# Patient Record
Sex: FEMALE | Race: WHITE | NOT HISPANIC OR LATINO | Employment: OTHER | URBAN - METROPOLITAN AREA
[De-identification: names, ages, dates, MRNs, and addresses within clinical notes are randomized per-mention and may not be internally consistent; named-entity substitution may affect disease eponyms.]

---

## 2023-10-19 PROBLEM — M16.11 UNILATERAL PRIMARY OSTEOARTHRITIS, RIGHT HIP: Status: ACTIVE | Noted: 2023-10-19

## 2023-11-13 ENCOUNTER — APPOINTMENT (OUTPATIENT)
Dept: PREADMISSION TESTING | Facility: HOSPITAL | Age: 73
End: 2023-11-13
Payer: MEDICARE

## 2023-12-11 ENCOUNTER — APPOINTMENT (OUTPATIENT)
Dept: PREADMISSION TESTING | Facility: HOSPITAL | Age: 73
End: 2023-12-11
Payer: MEDICARE

## 2023-12-12 ENCOUNTER — OFFICE VISIT (OUTPATIENT)
Dept: ORTHOPEDIC SURGERY | Facility: HOSPITAL | Age: 73
End: 2023-12-12
Payer: MEDICARE

## 2023-12-12 DIAGNOSIS — M16.11 UNILATERAL PRIMARY OSTEOARTHRITIS, RIGHT HIP: Primary | ICD-10-CM

## 2023-12-12 PROCEDURE — 20611 DRAIN/INJ JOINT/BURSA W/US: CPT | Performed by: FAMILY MEDICINE

## 2023-12-12 PROCEDURE — 2500000004 HC RX 250 GENERAL PHARMACY W/ HCPCS (ALT 636 FOR OP/ED): Performed by: FAMILY MEDICINE

## 2023-12-12 PROCEDURE — 99214 OFFICE O/P EST MOD 30 MIN: CPT | Performed by: FAMILY MEDICINE

## 2023-12-12 PROCEDURE — 2500000005 HC RX 250 GENERAL PHARMACY W/O HCPCS: Performed by: FAMILY MEDICINE

## 2023-12-12 RX ADMIN — METHYLPREDNISOLONE ACETATE 80 MG: 40 INJECTION, SUSPENSION INTRA-ARTICULAR; INTRALESIONAL; INTRAMUSCULAR; INTRASYNOVIAL; SOFT TISSUE at 13:42

## 2023-12-12 RX ADMIN — LIDOCAINE HYDROCHLORIDE 7 ML: 10 INJECTION, SOLUTION EPIDURAL; INFILTRATION; INTRACAUDAL; PERINEURAL at 13:42

## 2023-12-12 NOTE — PROGRESS NOTES
Patient ID: Nancy Nuñez is a 73 y.o. female.    L Inj/Asp: R hip joint on 12/12/2023 1:42 PM  Indications: pain  Details: 20 G needle, ultrasound-guided anterior approach  Medications: 80 mg methylPREDNISolone acetate 40 mg/mL; 7 mL lidocaine PF 10 mg/mL (1 %)  Procedure, treatment alternatives, risks and benefits explained, specific risks discussed. Consent was given by the patient. Immediately prior to procedure a time out was called to verify the correct patient, procedure, equipment, support staff and site/side marked as required. Patient was prepped and draped in the usual sterile fashion.

## 2023-12-12 NOTE — PROGRESS NOTES
FUV/New Problem: Right Hip  Sports Medicine Office Note    Today's Date:  12/12/2023     HPI: Nancy Nuñez is a 73 y.o. retired  for [employer] and patient of Dr. Rodriguez who presents today for possible right intra-articular hip injection.  She is scheduled for a right hip replacement on 6/5/2024.  She has had ongoing right hip pain for about a year and does not recall any injury or trauma prior to the onset of pain.  She was taking Advil regularly up until a couple weeks ago when she discontinued it due to it being ineffective.  The pain is current all the time and worsens with ambulation.  Denies numbness, tingling.    She has no other complaints.    Physical Examination:   The right hip and pelvis are without obvious signs of acute bony deformity, swelling, erythema, ecchymosis or instability. Active and passive range of motion are limited and painful. Log roll is positive. Straight leg raise test is negative. Hermilo is positive. Crossover is negative. Hip strength is weak as compared to the opposite hip. The opposite hip is otherwise nontender and stable. Gait is antalgic and tandem.     Imaging:  === 09/22/23 ===    HIP W PELVIS    - Impression -  Severe right hip arthrosis with dysplasia.    Uncomplicated total left hip arthroplasty.    Procedure:  After consent was obtained, Right hip joint was prepped in a sterile fashion. Ultrasound guidance was used to help insure proper needle placement into the right hip joint, decrease patient discomfort, and decrease collateral damage. The joint was visualized and Depo-Medrol 80 mg with lidocaine 4 mL & ropivacaine 4 mL were injected without any complications. Ultrasound images were saved on an internal file for later reference. The patient tolerated the procedure well and the area was cleaned and bandaged.    Procedure Note Attestation   Procedure performed by my sports medicine fellow.    I, Dr. Issa Rivas MD, supervised the  entire procedure .    Problem List Items Addressed This Visit             ICD-10-CM    Unilateral primary osteoarthritis, right hip - Primary M16.11    Relevant Orders    Point of Care Ultrasound (Completed)       Assessment and Plan:  We reviewed the exam and x-ray findings and discussed the conservative and surgical treatment options. We agreed to a diagnostic and hopefully therapeutic cortisone injection into the RIGHT hip joint.   She tolerated this well. Activity modifications were reviewed.  She will keep any scheduled follow-up with Dr. Rodriguez. I will see her back as needed.    **This note was dictated using Dragon speech recognition software and was not corrected for spelling or grammatical errors**.

## 2023-12-13 RX ORDER — LIDOCAINE HYDROCHLORIDE 10 MG/ML
7 INJECTION, SOLUTION EPIDURAL; INFILTRATION; INTRACAUDAL; PERINEURAL
Status: COMPLETED | OUTPATIENT
Start: 2023-12-12 | End: 2023-12-12

## 2023-12-13 RX ORDER — METHYLPREDNISOLONE ACETATE 40 MG/ML
80 INJECTION, SUSPENSION INTRA-ARTICULAR; INTRALESIONAL; INTRAMUSCULAR; SOFT TISSUE
Status: COMPLETED | OUTPATIENT
Start: 2023-12-12 | End: 2023-12-12

## 2023-12-14 ENCOUNTER — OFFICE VISIT (OUTPATIENT)
Dept: GASTROENTEROLOGY | Facility: CLINIC | Age: 73
End: 2023-12-14
Payer: MEDICARE

## 2023-12-14 VITALS — HEART RATE: 74 BPM | WEIGHT: 150 LBS | BODY MASS INDEX: 24.21 KG/M2

## 2023-12-14 DIAGNOSIS — K59.04 CHRONIC IDIOPATHIC CONSTIPATION: Primary | ICD-10-CM

## 2023-12-14 PROCEDURE — 1159F MED LIST DOCD IN RCRD: CPT | Performed by: INTERNAL MEDICINE

## 2023-12-14 PROCEDURE — 99214 OFFICE O/P EST MOD 30 MIN: CPT | Performed by: INTERNAL MEDICINE

## 2023-12-14 PROCEDURE — 1036F TOBACCO NON-USER: CPT | Performed by: INTERNAL MEDICINE

## 2023-12-14 RX ORDER — MIRTAZAPINE 7.5 MG/1
7.5 TABLET, FILM COATED ORAL NIGHTLY PRN
COMMUNITY
Start: 2023-11-06 | End: 2024-05-22 | Stop reason: ALTCHOICE

## 2023-12-14 RX ORDER — AZELASTINE 1 MG/ML
2 SPRAY, METERED NASAL 2 TIMES DAILY
COMMUNITY
Start: 2022-02-22 | End: 2024-05-22 | Stop reason: ALTCHOICE

## 2023-12-14 RX ORDER — DOCUSATE SODIUM 100 MG/1
100 CAPSULE, LIQUID FILLED ORAL 2 TIMES DAILY PRN
COMMUNITY
Start: 2022-04-20 | End: 2024-05-22 | Stop reason: ALTCHOICE

## 2023-12-14 RX ORDER — MELOXICAM 15 MG/1
15 TABLET ORAL DAILY
COMMUNITY
Start: 2022-04-20 | End: 2024-05-22 | Stop reason: ALTCHOICE

## 2023-12-14 RX ORDER — TRAMADOL HYDROCHLORIDE 50 MG/1
50 TABLET ORAL EVERY 8 HOURS PRN
COMMUNITY
Start: 2022-04-20 | End: 2024-05-22 | Stop reason: ALTCHOICE

## 2023-12-14 RX ORDER — ARIPIPRAZOLE 2 MG/1
2 TABLET ORAL DAILY
COMMUNITY
Start: 2022-04-05 | End: 2024-05-22 | Stop reason: ALTCHOICE

## 2023-12-14 RX ORDER — METFORMIN HYDROCHLORIDE 500 MG/1
500 TABLET ORAL
COMMUNITY
Start: 2016-03-25 | End: 2024-05-22 | Stop reason: ALTCHOICE

## 2023-12-14 RX ORDER — MELATONIN 5 MG
1 CAPSULE ORAL NIGHTLY
COMMUNITY
End: 2024-05-22 | Stop reason: ALTCHOICE

## 2023-12-14 RX ORDER — ESCITALOPRAM OXALATE 10 MG/1
20 TABLET ORAL NIGHTLY
COMMUNITY
Start: 2023-12-13

## 2023-12-14 RX ORDER — BUSPIRONE HYDROCHLORIDE 7.5 MG/1
7.5 TABLET ORAL 3 TIMES DAILY
COMMUNITY
Start: 2023-10-06 | End: 2024-05-22 | Stop reason: ALTCHOICE

## 2023-12-14 RX ORDER — AMLODIPINE BESYLATE 2.5 MG/1
2.5 TABLET ORAL DAILY
Status: ON HOLD | COMMUNITY
Start: 2022-02-08 | End: 2024-06-06

## 2023-12-14 RX ORDER — IBUPROFEN 200 MG
200 TABLET ORAL EVERY 8 HOURS PRN
COMMUNITY
End: 2024-05-22 | Stop reason: ALTCHOICE

## 2023-12-14 RX ORDER — OXYCODONE HYDROCHLORIDE 5 MG/1
5 TABLET ORAL EVERY 6 HOURS PRN
COMMUNITY
Start: 2022-04-20 | End: 2024-05-22 | Stop reason: ALTCHOICE

## 2023-12-14 RX ORDER — LEVOTHYROXINE SODIUM 150 UG/1
150 TABLET ORAL
COMMUNITY
Start: 2022-02-05 | End: 2024-05-22 | Stop reason: ALTCHOICE

## 2023-12-14 RX ORDER — CITALOPRAM 20 MG/1
20 TABLET, FILM COATED ORAL DAILY
COMMUNITY
Start: 2023-10-12 | End: 2024-05-22 | Stop reason: ALTCHOICE

## 2023-12-14 RX ORDER — CITALOPRAM 10 MG/1
10 TABLET ORAL DAILY
COMMUNITY
Start: 2022-03-09 | End: 2024-05-22 | Stop reason: ALTCHOICE

## 2023-12-14 RX ORDER — ZOLPIDEM TARTRATE 5 MG/1
5 TABLET ORAL NIGHTLY PRN
COMMUNITY
Start: 2023-11-21 | End: 2024-05-22 | Stop reason: ALTCHOICE

## 2023-12-14 RX ORDER — PANTOPRAZOLE SODIUM 40 MG/1
40 TABLET, DELAYED RELEASE ORAL
COMMUNITY
Start: 2022-04-20 | End: 2024-01-12 | Stop reason: SDUPTHER

## 2023-12-14 RX ORDER — CALCIUM CARBONATE 200(500)MG
2 TABLET,CHEWABLE ORAL DAILY
COMMUNITY
Start: 2017-06-07 | End: 2024-05-22 | Stop reason: ALTCHOICE

## 2023-12-14 RX ORDER — LEVOTHYROXINE SODIUM 112 UG/1
112 TABLET ORAL DAILY
COMMUNITY
Start: 2023-10-08

## 2023-12-14 RX ORDER — SIMVASTATIN 10 MG/1
10 TABLET, FILM COATED ORAL NIGHTLY
COMMUNITY
Start: 2022-03-02

## 2023-12-14 RX ORDER — ACETAMINOPHEN 500 MG
500 TABLET ORAL EVERY 8 HOURS PRN
COMMUNITY
Start: 2022-04-20 | End: 2024-05-22 | Stop reason: ALTCHOICE

## 2023-12-14 RX ORDER — ASPIRIN 81 MG/1
81 TABLET ORAL DAILY
COMMUNITY
Start: 2022-04-20 | End: 2024-05-22 | Stop reason: ALTCHOICE

## 2023-12-14 RX ORDER — GLUCOSAM/CHONDRO/HERB 149/HYAL 750-100 MG
1 TABLET ORAL DAILY
COMMUNITY
Start: 2022-04-06 | End: 2024-05-22 | Stop reason: ALTCHOICE

## 2023-12-14 RX ORDER — PSYLLIUM SEED
1 PACKET (EA) ORAL DAILY
COMMUNITY
Start: 2022-04-20 | End: 2024-05-22 | Stop reason: ALTCHOICE

## 2023-12-14 RX ORDER — PSYLLIUM SEED (WITH DEXTROSE)
1 POWDER (GRAM) ORAL DAILY
COMMUNITY
End: 2024-05-22 | Stop reason: ALTCHOICE

## 2023-12-14 RX ORDER — ALENDRONATE SODIUM 70 MG/1
70 TABLET ORAL
COMMUNITY
Start: 2022-04-05

## 2023-12-14 ASSESSMENT — ENCOUNTER SYMPTOMS
CONSTITUTIONAL NEGATIVE: 1
ENDOCRINE NEGATIVE: 1
CARDIOVASCULAR NEGATIVE: 1
RESPIRATORY NEGATIVE: 1
PSYCHIATRIC NEGATIVE: 1
ALLERGIC/IMMUNOLOGIC NEGATIVE: 1
GASTROINTESTINAL NEGATIVE: 1
EYES NEGATIVE: 1
HEMATOLOGIC/LYMPHATIC NEGATIVE: 1
MUSCULOSKELETAL NEGATIVE: 1
NEUROLOGICAL NEGATIVE: 1

## 2023-12-14 NOTE — ASSESSMENT & PLAN NOTE
Patient is a 73-year-old with depression, chronic constipation, diverticulosis and who had a colonoscopy this year.  Her main complaint is she is having worsening constipation as her mental health medicines have been changed.  She feels constipated and went to [place] emergency room where labs were done which were unremarkable.  She also has been seen and her thyroids also been evaluated and per the patient and her  this blood test is also within normal limits.  She feels bloated and distended.  At this point I have written and highlighted a plan of a short-term plan of a good cleanout with citrate of magnesium and a fleets enema.  Following this she will do Metamucil 1 tablespoon daily and use MiraLAX every day or every other day.  She will drink plenty of fluids and exercise.  She will call me if no improvement.

## 2023-12-14 NOTE — PROGRESS NOTES
Constipation    History of Present Illness:   Nancy Nuñez is a 73 y.o. female with PMHx of depression and history of diverticulosis who presents to GI clinic for follow up.  Last seen around spring for colonoscopy.  This showed diverticulosis and no other anatomic abnormality.  Over the last 2 months she has had changes in her medicine for mental health that led to worsening constipation.  She went to the emergency room where she was evaluated and felt to be impacted.  Fleets enema was done with relief.  She is here for guidance on chronic constipation.  She denies rectal bleeding or vomiting.        Review of Systems  Review of Systems   Constitutional: Negative.    HENT: Negative.     Eyes: Negative.    Respiratory: Negative.     Cardiovascular: Negative.    Gastrointestinal: Negative.    Endocrine: Negative.    Genitourinary: Negative.    Musculoskeletal: Negative.    Skin: Negative.    Allergic/Immunologic: Negative.    Neurological: Negative.    Hematological: Negative.    Psychiatric/Behavioral: Negative.     All other systems reviewed and are negative.    Allergies  Allergies   Allergen Reactions    Cat Dander Unknown    Dog Dander Unknown    House Dust Unknown    Mold Unknown       Medications  Current Outpatient Medications   Medication Instructions    acetaminophen (TYLENOL) 500 mg, oral, Every 8 hours PRN    alendronate (FOSAMAX) 70 mg, oral, Every 7 days    amLODIPine (NORVASC) 2.5 mg, oral, Daily    ARIPiprazole (ABILIFY) 2 mg, oral, Daily    aspirin (ADULT LOW DOSE ASPIRIN) 81 mg, oral, Daily    azelastine (Astelin) 137 mcg (0.1 %) nasal spray 2 sprays, Each Nostril, 2 times daily    busPIRone (BUSPAR) 7.5 mg, oral, 3 times daily    calcium carbonate (Tums) 200 mg calcium chewable tablet 2 tablets, oral, Daily    citalopram (CELEXA) 10 mg, oral, Daily    citalopram (CELEXA) 20 mg, oral, Daily    docusate sodium (COLACE) 100 mg, oral, 2 times daily PRN    escitalopram (LEXAPRO) 10 mg, oral, Daily     ibuprofen (ADVIL) 200 mg, oral, Every 8 hours PRN    levothyroxine (SYNTHROID, LEVOXYL) 150 mcg, oral, Daily before breakfast    levothyroxine (SYNTHROID, LEVOXYL) 112 mcg, oral, Daily    melatonin 5 mg capsule 1 capsule, oral, Nightly    meloxicam (MOBIC) 15 mg, oral, Daily    metFORMIN (GLUCOPHAGE) 500 mg, oral, 2 times daily with meals    mirtazapine (REMERON) 7.5 mg, oral, Nightly PRN    omega 3-dha-epa-fish oil (Fish OiL) 1,000 mg (120 mg-180 mg) capsule 1 capsule, oral, Daily    oxyCODONE (ROXICODONE) 5 mg, oral, Every 6 hours PRN    pantoprazole (PROTONIX) 40 mg, oral, Daily before breakfast    psyllium husk (MetamuciL) 3.4 gram/5.4 gram powder 1 Scoop, oral, Daily    psyllium husk, with sugar, (Metamucil, with sugar,) 3.4 gram/12 gram powder 1 Scoop, oral, Daily    simvastatin (ZOCOR) 10 mg, oral, Nightly    traMADol (ULTRAM) 50 mg, oral, Every 8 hours PRN    zolpidem (AMBIEN) 5 mg, oral, Nightly PRN        Objective   Visit Vitals  Pulse 74      Physical Exam  Constitutional:       Appearance: Normal appearance.   Eyes:      Conjunctiva/sclera: Conjunctivae normal.   Cardiovascular:      Rate and Rhythm: Normal rate and regular rhythm.   Pulmonary:      Effort: Pulmonary effort is normal.      Breath sounds: Normal breath sounds.   Abdominal:      General: Abdomen is flat. Bowel sounds are normal.      Palpations: Abdomen is soft.   Musculoskeletal:         General: Normal range of motion.      Cervical back: Normal range of motion.   Neurological:      Mental Status: She is alert.       Lab Results   Component Value Date    WBC 11.9 (H) 04/21/2022    WBC 6.4 04/08/2022    HGB 10.9 (L) 04/21/2022    HGB 12.4 04/08/2022    HCT 33.8 (L) 04/21/2022    HCT 38.9 04/08/2022     04/21/2022     04/08/2022     Lab Results   Component Value Date     (L) 04/08/2022     02/23/2022     (L) 10/27/2020    K 3.9 04/08/2022    K 4.3 02/23/2022    K 4.2 10/27/2020    CL 99 04/08/2022    CL 99  02/23/2022    CL 99 10/27/2020    CO2 28 04/08/2022    CO2 30 02/23/2022    CO2 28 10/27/2020    BUN 14 04/08/2022    BUN 11 02/23/2022    BUN 14 10/27/2020    CREATININE 0.85 04/08/2022    CREATININE 0.81 02/23/2022    CREATININE 0.98 10/27/2020    CALCIUM 9.8 04/08/2022    CALCIUM 10.0 02/23/2022    CALCIUM 10.4 10/27/2020    GLUCOSE 83 04/08/2022    GLUCOSE 81 02/23/2022    GLUCOSE 98 10/27/2020           Nancy Nuñez is a 73 y.o. female who presents to GI clinic for constipation.    Chronic idiopathic constipation  Patient is a 73-year-old with depression, chronic constipation, diverticulosis and who had a colonoscopy this year.  Her main complaint is she is having worsening constipation as her mental health medicines have been changed.  She feels constipated and went to [place] emergency room where labs were done which were unremarkable.  She also has been seen and her thyroids also been evaluated and per the patient and her  this blood test is also within normal limits.  She feels bloated and distended.  At this point I have written and highlighted a plan of a short-term plan of a good cleanout with citrate of magnesium and a fleets enema.  Following this she will do Metamucil 1 tablespoon daily and use MiraLAX every day or every other day.  She will drink plenty of fluids and exercise.  She will call me if no improvement.       Miky Ardon MD

## 2023-12-26 ENCOUNTER — APPOINTMENT (OUTPATIENT)
Dept: BEHAVIORAL HEALTH | Facility: CLINIC | Age: 73
End: 2023-12-26
Payer: MEDICARE

## 2023-12-28 ENCOUNTER — TELEPHONE (OUTPATIENT)
Dept: GASTROENTEROLOGY | Facility: CLINIC | Age: 73
End: 2023-12-28
Payer: MEDICARE

## 2023-12-28 DIAGNOSIS — K21.9 GASTROESOPHAGEAL REFLUX DISEASE WITHOUT ESOPHAGITIS: ICD-10-CM

## 2023-12-28 DIAGNOSIS — R10.13 DYSPEPSIA: Primary | ICD-10-CM

## 2023-12-28 NOTE — TELEPHONE ENCOUNTER
----- Message from Adrianne Zambrano MA sent at 12/28/2023  9:21 AM EST -----  Regarding: EGD   calls.  You saw this patient for constipation on 12/14.  I emailed you on 12/26 when he called complaining that she was no better.  Your response was to go to ER, I let  know.  He called a few minutes ago and stated that patient spent 20 hours at [facility] where the ER doc said she needs EGD but did not place order.   asking you to place order and he wants to do the procedure with first available - I dont know if he meant [facility] or [facility] doc, its hard to ask him anything because he just doesn't stop talking.  I cannot find any notes in chart indicating that she was seen at [facility].  Please advise.     [phone]    I reviewed the workup in emergency room at [facility] which was essentially unremarkable without evidence of any liver, pancreas or gallbladder issue.  The assessment was consistent with my assessment over the phone that we are dealing with dyspepsia and/or peptic ulcer disease.  I had a long discussion with the  today and recommended omeprazole 40 mg daily and an antiacid as needed.  I reassured her there is no evidence of malignancy.  I suggested we will pursue upper endoscopy in the next 2 weeks.  He is comfortable with this approach and we will call to schedule.

## 2024-01-05 ENCOUNTER — APPOINTMENT (OUTPATIENT)
Dept: GASTROENTEROLOGY | Facility: CLINIC | Age: 74
End: 2024-01-05
Payer: MEDICARE

## 2024-01-11 ENCOUNTER — APPOINTMENT (OUTPATIENT)
Dept: ORTHOPEDIC SURGERY | Facility: CLINIC | Age: 74
End: 2024-01-11
Payer: MEDICARE

## 2024-01-12 ENCOUNTER — OFFICE VISIT (OUTPATIENT)
Dept: GASTROENTEROLOGY | Facility: EXTERNAL LOCATION | Age: 74
End: 2024-01-12
Payer: MEDICARE

## 2024-01-12 DIAGNOSIS — R10.13 DYSPEPSIA: ICD-10-CM

## 2024-01-12 DIAGNOSIS — R10.13 DYSPEPSIA: Primary | ICD-10-CM

## 2024-01-12 DIAGNOSIS — K21.9 GASTROESOPHAGEAL REFLUX DISEASE WITHOUT ESOPHAGITIS: Primary | ICD-10-CM

## 2024-01-12 DIAGNOSIS — K21.9 GASTROESOPHAGEAL REFLUX DISEASE WITHOUT ESOPHAGITIS: ICD-10-CM

## 2024-01-12 PROCEDURE — 88305 TISSUE EXAM BY PATHOLOGIST: CPT | Performed by: PATHOLOGY

## 2024-01-12 PROCEDURE — 87900 PHENOTYPE INFECT AGENT DRUG: CPT

## 2024-01-12 PROCEDURE — 88305 TISSUE EXAM BY PATHOLOGIST: CPT

## 2024-01-12 PROCEDURE — 1036F TOBACCO NON-USER: CPT | Performed by: INTERNAL MEDICINE

## 2024-01-12 PROCEDURE — 43239 EGD BIOPSY SINGLE/MULTIPLE: CPT | Performed by: INTERNAL MEDICINE

## 2024-01-12 RX ORDER — PANTOPRAZOLE SODIUM 40 MG/1
40 TABLET, DELAYED RELEASE ORAL DAILY
Qty: 90 TABLET | Refills: 3 | Status: SHIPPED | OUTPATIENT
Start: 2024-01-12 | End: 2024-06-06 | Stop reason: HOSPADM

## 2024-01-12 NOTE — PROGRESS NOTES
Patient underwent upper endoscopy for epigastric discomfort that showed only gastritis.  There is no evidence of any esophagitis, hiatal hernia or malignancy.  I am recommending a trial of a proton pump inhibitor 40 mg of Protonix daily for at least 2 months.  We will check biopsies for H. pylori.

## 2024-01-15 ENCOUNTER — LAB REQUISITION (OUTPATIENT)
Dept: LAB | Facility: HOSPITAL | Age: 74
End: 2024-01-15
Payer: MEDICARE

## 2024-01-17 LAB
LABORATORY COMMENT REPORT: NORMAL
PATH REPORT.FINAL DX SPEC: NORMAL
PATH REPORT.GROSS SPEC: NORMAL
PATH REPORT.RELEVANT HX SPEC: NORMAL
PATH REPORT.TOTAL CANCER: NORMAL

## 2024-01-25 ENCOUNTER — APPOINTMENT (OUTPATIENT)
Dept: GASTROENTEROLOGY | Facility: CLINIC | Age: 74
End: 2024-01-25
Payer: MEDICARE

## 2024-01-26 DIAGNOSIS — A04.8 H. PYLORI INFECTION: Primary | ICD-10-CM

## 2024-01-26 LAB
ELECTRONICALLY SIGNED BY: NORMAL
H. PYLORI DRUG SUSCEPTIBILITY RESULTS: NORMAL

## 2024-01-26 RX ORDER — PANTOPRAZOLE SODIUM 40 MG/1
40 TABLET, DELAYED RELEASE ORAL 2 TIMES DAILY
Qty: 28 TABLET | Refills: 0 | Status: SHIPPED | OUTPATIENT
Start: 2024-01-26 | End: 2024-06-06 | Stop reason: HOSPADM

## 2024-01-26 RX ORDER — AMOXICILLIN 500 MG/1
1000 CAPSULE ORAL 2 TIMES DAILY
Qty: 56 CAPSULE | Refills: 0 | Status: SHIPPED | OUTPATIENT
Start: 2024-01-26 | End: 2024-02-09

## 2024-01-26 RX ORDER — CLARITHROMYCIN 500 MG/1
500 TABLET, FILM COATED ORAL 2 TIMES DAILY
Qty: 28 TABLET | Refills: 0 | Status: SHIPPED | OUTPATIENT
Start: 2024-01-26 | End: 2024-02-09

## 2024-01-26 NOTE — PROGRESS NOTES
The H. pylori analysis is reviewed and I am prescribing triple therapy of clarithromycin 500 mg twice daily, amoxicillin 1 g twice daily, and pantoprazole 40 mg twice daily for 2 weeks.  Following this we will pursue a breath test.  I left a voicemail with the patient.

## 2024-02-01 ENCOUNTER — APPOINTMENT (OUTPATIENT)
Dept: ORTHOPEDIC SURGERY | Facility: CLINIC | Age: 74
End: 2024-02-01
Payer: MEDICARE

## 2024-02-23 ENCOUNTER — TELEPHONE (OUTPATIENT)
Dept: GASTROENTEROLOGY | Facility: CLINIC | Age: 74
End: 2024-02-23
Payer: MEDICARE

## 2024-02-26 DIAGNOSIS — A04.8 H. PYLORI INFECTION: Primary | ICD-10-CM

## 2024-03-05 ENCOUNTER — LAB (OUTPATIENT)
Dept: LAB | Facility: LAB | Age: 74
End: 2024-03-05
Payer: MEDICARE

## 2024-03-05 DIAGNOSIS — A04.8 H. PYLORI INFECTION: ICD-10-CM

## 2024-03-05 PROCEDURE — 83013 H PYLORI (C-13) BREATH: CPT

## 2024-03-06 LAB — UREA BREATH TEST QL: NEGATIVE

## 2024-04-18 ENCOUNTER — APPOINTMENT (OUTPATIENT)
Dept: RADIOLOGY | Facility: CLINIC | Age: 74
End: 2024-04-18
Payer: MEDICARE

## 2024-04-19 ENCOUNTER — APPOINTMENT (OUTPATIENT)
Dept: ORTHOPEDIC SURGERY | Facility: CLINIC | Age: 74
End: 2024-04-19
Payer: MEDICARE

## 2024-04-26 ENCOUNTER — OFFICE VISIT (OUTPATIENT)
Dept: ORTHOPEDIC SURGERY | Facility: CLINIC | Age: 74
End: 2024-04-26
Payer: MEDICARE

## 2024-04-26 VITALS — HEIGHT: 66 IN | BODY MASS INDEX: 24.11 KG/M2 | WEIGHT: 150 LBS

## 2024-04-26 DIAGNOSIS — M16.11 PRIMARY OSTEOARTHRITIS OF RIGHT HIP: Primary | ICD-10-CM

## 2024-04-26 PROCEDURE — 1159F MED LIST DOCD IN RCRD: CPT | Performed by: ORTHOPAEDIC SURGERY

## 2024-04-26 PROCEDURE — 1036F TOBACCO NON-USER: CPT | Performed by: ORTHOPAEDIC SURGERY

## 2024-04-26 PROCEDURE — 99214 OFFICE O/P EST MOD 30 MIN: CPT | Performed by: ORTHOPAEDIC SURGERY

## 2024-04-26 PROCEDURE — 1125F AMNT PAIN NOTED PAIN PRSNT: CPT | Performed by: ORTHOPAEDIC SURGERY

## 2024-04-26 ASSESSMENT — PAIN DESCRIPTION - DESCRIPTORS: DESCRIPTORS: SORE;SHOOTING;DISCOMFORT

## 2024-04-26 ASSESSMENT — PAIN SCALES - GENERAL: PAINLEVEL_OUTOF10: 8

## 2024-04-26 ASSESSMENT — PAIN - FUNCTIONAL ASSESSMENT: PAIN_FUNCTIONAL_ASSESSMENT: 0-10

## 2024-04-26 NOTE — PROGRESS NOTES
Patient is a 73-year-old female presents today for discussion of upcoming right total hip arthroplasty.  She previous underwent left total hip arthroplasty and has done well from that standpoint.  She has had some recent changes in her medical history was diagnosed with H. pylori and had treatment for that.  She has had injections in the hip.  She uses a cane.  She is been in physical therapy.  Nothing is helping.  She cannot take anti-inflammatories secondary to her stomach issues.  She rates her pain at times as 8 out of 10.  She would like proceed with total hip arthroplasty which is indicated at this time.    Right hip:    AAOx3, NAD, walks with a moderate antalgic gait  Significant pain with flexion internal rotation  Positive [facility]  Mild TTP over trochanter laterally  5/5 Hip flexion/knee extension/df/pf/ehl  SILT in a mathew/saph/per/tib distribution  ½ dorsalis pedis and posterior tibial pulse  no popliteal or inguinal lymphadenopathy  no other overlying lesions  mood: euthymic  Respirations non    Plain films were reviewed by myself in clinic today.  They have advanced osteoarthritis of their hip with significant joint space narrowing, bone on bone changes, underlying sclerosis and bipolar osteophytic change acetabular dysplasia.    Patient is now failed a greater than 3-month trial of reasonable conservative treatment and wishes to proceed with total hip arthroplasty which is indicated at this time.  We discussed the risk benefits alternatives to surgery.  All of their questions were answered.    Procedure: right total hip  Location: [facility]  ICD10: M16.11  CPT: 23311  Stay: overnight  Equipment: Synack/SkyBulls    I talked with the patient at length about risks, limitations, benefits and alternatives to total hip replacement today. I reviewed concerns about implant wear, loosening, breakage, infection, infection prophylaxis, DVT, PE, death and other medical and anesthetic complications of surgery. We  talked about the potential for persistent pain following surgery since there are many possible causes for hip and leg pain. The patient was advised that hip replacement will only relieve pain that is coming from the hip. We talked about leg length discrepancy, neurovascular problems and dislocation after surgery. The patient understands that we may have to lengthen the leg slightly to provide for adequate stability of the hip. I reviewed dislocation precautions and activity restrictions in detail. We discussed advantages and disadvantages of cemented and cementless implant fixation. We discussed the concerns about intraoperative fracture, ingrowth failure, thigh pain and possible post-operative weight bearing restrictions following cementless hip replacement. We discussed advantages and disadvantages of different surgical approaches. We discussed the possible need for a homologous blood transfusion. We discussed the fact that many of our patients are able to go home in 1 day or less depending on their health, mobility, pre-op preparation, individual home situation and personal preference. The patient has identified their personal goals of their joint replacement surgery and recovery and we have discussed them. These are documented in our [employer] patient engagement platform. In addition, we have discussed the advantages and disadvantages of various implant and fixation options, as well as various surgical approaches.  The basic concepts of the joint replacement procedure has been reviewed with the patient and the patient has been provided the opportunity to see an actual implant either in the office or in our pre-op education class. All of the patients questions were answered. The patient can call my office to schedule surgery and the pre-op teaching class. I told the patient that they should contact their primary care physician to discuss fitness for surgery.     This note was created using voice recognition  software and was not corrected for typographical or grammatical errors.

## 2024-05-03 NOTE — PROGRESS NOTES
Thank you for attending our Joint Replacement class today in preparation for your upcoming surgery.  Topics discussed include:    MyChart Enrollment  Communication with Care Team  My Chart is the best form of communication to reach all of your caregivers  You can send messages to specific care givers, or a care team  Continued Education  You will be enrolled in a Total Joint Replacement care plan to receive additional education before and after surgery  You can review a short recording of the class content  Access to Medical Records  You can access test results, office notes, appointments, etc.  You can connect to other healthcare systems who use [employer] ([facility], [facility], [facility], etc.)  [employer]  Program Information  Consent to Enroll    Background/Understanding of Joint Replacement Surgery  Potential for same day discharge  Any questions or concerns to be directed to the surgeon's office    How to Prepare for Surgery  Use of Nicotine Products/Smoking  Stop several weeks before surgery  Such products slow down the healing process and increase risk of post-op infection and complications  Clearance for Surgery  Medical Clearance by Specialists  Dental Clearance  Cracked/Broken/Loose teeth left untreated may postpone surgery  The importance of post-op antibiotics for dental visits per surgeon protocol  Preadmission Testing  **Potential for postponed surgery if appropriate clearance is not obtained  Medication Instruction  Follow instructions provided by the doctor who prescribes your medication (typically, but not limited to cardiologist)  Preadmission testing will provide additional instructions during your appointment on what to stop and what to take as you get closer to surgery  For clarification of these instructions, please call preadmission testing directly - [phone]  Tips for Preparing the home for discharge from the hospital  Care Partner  Requirement for surgery, the patient must have a plan to have  help at home  Potential for postponed surgery if plan for home support cannot be established  How the care partner can help after surgery  CHG Body Wash/Mouth Wash  Follow the instructions given at preadmission testing  Body wash is to be used on the body and hair for 5 washes  Mouthwash is to be used the night before and morning of surgery  **This is a system-wide protocol developed by infectious disease professionals, we will not alter our recommendations for those with sensitive skin or those who have special hair needs.  Please follow the instructions as they are written as this will provide the best infection prevention measures for surgery.  Should you have an allergy to one of the products, please discuss with your preadmission team**    What to Expect in the Hospital/At Home  Morning of Surgery NPO Guidelines  Nothing to eat after midnight  Water can be consumed up to 2 hours prior to arrival  Surgical and Post-Surgical Care Team  Surgical Team  Anesthesia Team  Nursing  Physical Therapy  Care Coordinating  Pharmacy  Hospital Arrival Instructions  Arrive at the time provided to you  Consider traffic patterns (rush-hour) based on arrival time  Have arrangements made for a ride home  If discharging same day, care partner should remain at the hospital  Recovering after Surgery  Recovery Room - Visitors are not brought back  Transition to hospital room - 2nd Floor, Visitors will be directed to your room  The presence of and strategies for controlling surgical pain and swelling  The importance of early mobility  Side effects after surgery  What to expect if staying overnight    Discharge Planning  The intended plan for discharge will be for patients to discharge home  All patients require a care partner (family, friend, neighbor, etc.) to stay with the patient for the first few nights after surgery  The inability to secure help at home will postpone surgery  Home Care Services set up per surgeon order  Physical  Therapy  Occupational Therapy  **If desired, private duty care can be arranged by the patient ahead of time**  Outpatient Physical Therapy per surgeon order    Recovering at Home  Wound Care  Follow wound care instructions found in your discharge paperwork  Bandage is water-resistant and you may shower with the bandage  Do not scrub directly over the bandage  Do not submerge in water until cleared (bathtub, hot tub, pool, etc.)    Post-Op Risk Prevention  Infection Prevention  Promptly seek treatment for any infections post-operatively  Routine dental visits must be postponed for 3 months after surgery  Your surgeon may require antibiotics prior to future dental visits  Any concerns for infection not related directly to the knee or the hip should be managed by your primary care provider  Blood Clots  Be sure to complete the course of blood thinning medication as prescribed by your surgeon  Movement every 1-2 hours during the day is encouraged to prevent blood clots  Monitor for signs of blood clots  Wear compression stockings as prescribed by your surgeon  Constipation  Constipation is common following surgery  Drink plenty of fluids  Take stool softener/laxative as prescribed by your surgeon  Move around frequently  Eat foods high in fiber  Fall Prevention  Prepare home ahead of time to clear space to move with walker  Remove throw rugs and electrical cords from walkways  Install railings near any stairways with more than 2 steps  Use night lights for increased visibility at night  Continue to use your assistive device until cleared by surgeon or physical therapy  Dislocation Prevention - Not all procedures will have dislocation precautions  Follow dislocation precautions provided by your surgeon  It is OK to resume sexual activity about 6 weeks following surgery  Be sure to follow any dislocation precautions assigned    Durable Medical Equipment  Cold Therapy  Breg Cold Therapy Machines  Ice/Gel Packs  Assistive  Devices  Folding Walker with Wheels (in the front only)  No Rollators  Crutches if approved by Physical Therapy and Surgeon after surgery  Hip Kits  Raised Toilet Seats  Additional Compression Stockings    Joint Preservation  Healthy Activities when Cleared  Walking  Swimming  Bike Riding  Activities to Avoid  Refrain from repetitive motions which have a high impact on the joint  Gradual Progression  Progress activity slowly, listen to your body  Common Findings - NORMAL after surgery  Clicking/Grinding  Numbness near incision    Physical Therapy  Prehabilitation exercises  START TODAY ON BOTH LEGS  Surgery Specific Precautions  Follow surgery specific precautions found in your discharge paperwork    Follow-Up Visit  All patients will see their surgeon for a follow up visit after surgery  The visit may range from 2-6 weeks after surgery and is surgeon specific      Please don't hesitate to reach out if you have any additional questions or concerns.    Nellie Lewis MBA, BSN, RN-BC  HERNANDEZ WalkerN, RN  [facility] Nav[facility]  [facility]   [phone]

## 2024-05-07 ENCOUNTER — EDUCATION (OUTPATIENT)
Dept: ORTHOPEDIC SURGERY | Facility: HOSPITAL | Age: 74
End: 2024-05-07
Payer: MEDICARE

## 2024-05-22 ENCOUNTER — LAB (OUTPATIENT)
Dept: LAB | Facility: LAB | Age: 74
End: 2024-05-22
Payer: MEDICARE

## 2024-05-22 ENCOUNTER — HOSPITAL ENCOUNTER (OUTPATIENT)
Dept: RADIOLOGY | Facility: HOSPITAL | Age: 74
Discharge: HOME | End: 2024-05-22
Payer: MEDICARE

## 2024-05-22 ENCOUNTER — PRE-ADMISSION TESTING (OUTPATIENT)
Dept: PREADMISSION TESTING | Facility: HOSPITAL | Age: 74
End: 2024-05-22
Payer: MEDICARE

## 2024-05-22 VITALS
BODY MASS INDEX: 21.22 KG/M2 | RESPIRATION RATE: 16 BRPM | SYSTOLIC BLOOD PRESSURE: 129 MMHG | DIASTOLIC BLOOD PRESSURE: 77 MMHG | WEIGHT: 132.06 LBS | OXYGEN SATURATION: 96 % | TEMPERATURE: 98.1 F | HEIGHT: 66 IN | HEART RATE: 89 BPM

## 2024-05-22 DIAGNOSIS — R73.09 ELEVATED GLUCOSE: ICD-10-CM

## 2024-05-22 DIAGNOSIS — Z01.818 PREOPERATIVE CLEARANCE: ICD-10-CM

## 2024-05-22 DIAGNOSIS — Z01.818 PREOPERATIVE CLEARANCE: Primary | ICD-10-CM

## 2024-05-22 DIAGNOSIS — M16.11 UNILATERAL PRIMARY OSTEOARTHRITIS, RIGHT HIP: ICD-10-CM

## 2024-05-22 LAB
ALBUMIN SERPL BCP-MCNC: 4.2 G/DL (ref 3.4–5)
ALP SERPL-CCNC: 56 U/L (ref 33–136)
ALT SERPL W P-5'-P-CCNC: 6 U/L (ref 7–45)
ANION GAP SERPL CALC-SCNC: 12 MMOL/L (ref 10–20)
AST SERPL W P-5'-P-CCNC: 10 U/L (ref 9–39)
ATRIAL RATE: 79 BPM
BILIRUB SERPL-MCNC: 0.4 MG/DL (ref 0–1.2)
BUN SERPL-MCNC: 13 MG/DL (ref 6–23)
CALCIUM SERPL-MCNC: 9.8 MG/DL (ref 8.6–10.3)
CHLORIDE SERPL-SCNC: 98 MMOL/L (ref 98–107)
CO2 SERPL-SCNC: 25 MMOL/L (ref 21–32)
CREAT SERPL-MCNC: 0.92 MG/DL (ref 0.5–1.05)
EGFRCR SERPLBLD CKD-EPI 2021: 66 ML/MIN/1.73M*2
ERYTHROCYTE [DISTWIDTH] IN BLOOD BY AUTOMATED COUNT: 13.7 % (ref 11.5–14.5)
EST. AVERAGE GLUCOSE BLD GHB EST-MCNC: 108 MG/DL
GLUCOSE SERPL-MCNC: 84 MG/DL (ref 74–99)
HBA1C MFR BLD: 5.4 %
HCT VFR BLD AUTO: 41.9 % (ref 36–46)
HGB BLD-MCNC: 13.4 G/DL (ref 12–16)
MCH RBC QN AUTO: 30 PG (ref 26–34)
MCHC RBC AUTO-ENTMCNC: 32 G/DL (ref 32–36)
MCV RBC AUTO: 94 FL (ref 80–100)
NRBC BLD-RTO: NORMAL /100{WBCS}
P AXIS: 77 DEGREES
P OFFSET: 182 MS
P ONSET: 128 MS
PLATELET # BLD AUTO: 399 X10*3/UL (ref 150–450)
POTASSIUM SERPL-SCNC: 4.1 MMOL/L (ref 3.5–5.3)
PR INTERVAL: 178 MS
PROT SERPL-MCNC: 7.9 G/DL (ref 6.4–8.2)
Q ONSET: 217 MS
QRS COUNT: 13 BEATS
QRS DURATION: 82 MS
QT INTERVAL: 370 MS
QTC CALCULATION(BAZETT): 424 MS
QTC FREDERICIA: 405 MS
R AXIS: 51 DEGREES
RBC # BLD AUTO: 4.47 X10*6/UL (ref 4–5.2)
SODIUM SERPL-SCNC: 131 MMOL/L (ref 136–145)
T AXIS: 60 DEGREES
T OFFSET: 402 MS
VENTRICULAR RATE: 79 BPM
WBC # BLD AUTO: 7.5 X10*3/UL (ref 4.4–11.3)

## 2024-05-22 PROCEDURE — 73502 X-RAY EXAM HIP UNI 2-3 VIEWS: CPT | Mod: RIGHT SIDE | Performed by: RADIOLOGY

## 2024-05-22 PROCEDURE — 83036 HEMOGLOBIN GLYCOSYLATED A1C: CPT

## 2024-05-22 PROCEDURE — 36415 COLL VENOUS BLD VENIPUNCTURE: CPT

## 2024-05-22 PROCEDURE — 85027 COMPLETE CBC AUTOMATED: CPT

## 2024-05-22 PROCEDURE — 93005 ELECTROCARDIOGRAM TRACING: CPT

## 2024-05-22 PROCEDURE — 87081 CULTURE SCREEN ONLY: CPT

## 2024-05-22 PROCEDURE — 73502 X-RAY EXAM HIP UNI 2-3 VIEWS: CPT | Mod: RT

## 2024-05-22 PROCEDURE — 80053 COMPREHEN METABOLIC PANEL: CPT

## 2024-05-22 RX ORDER — POLYETHYLENE GLYCOL 3350 17 G/17G
17 POWDER, FOR SOLUTION ORAL DAILY PRN
COMMUNITY
End: 2024-06-06 | Stop reason: HOSPADM

## 2024-05-22 RX ORDER — CHLORHEXIDINE GLUCONATE ORAL RINSE 1.2 MG/ML
15 SOLUTION DENTAL DAILY
Qty: 30 ML | Refills: 0 | Status: SHIPPED | OUTPATIENT
Start: 2024-05-22 | End: 2024-05-24

## 2024-05-22 RX ORDER — LORAZEPAM 0.5 MG/1
0.5 TABLET ORAL EVERY 6 HOURS PRN
COMMUNITY

## 2024-05-22 ASSESSMENT — DUKE ACTIVITY SCORE INDEX (DASI)
DASI METS SCORE: 6
CAN YOU PARTICIPATE IN MODERATE RECREATIONAL ACTIVITIES LIKE GOLF, BOWLING, DANCING, DOUBLES TENNIS OR THROWING A BASEBALL OR FOOTBALL: YES
CAN YOU WALK A BLOCK OR TWO ON LEVEL GROUND: YES
CAN YOU RUN A SHORT DISTANCE: NO
CAN YOU DO YARD WORK LIKE RAKING LEAVES, WEEDING OR PUSHING A MOWER: NO
CAN YOU WALK INDOORS, SUCH AS AROUND YOUR HOUSE: YES
TOTAL_SCORE: 26.7
CAN YOU TAKE CARE OF YOURSELF (EAT, DRESS, BATHE, OR USE TOILET): YES
CAN YOU DO HEAVY WORK AROUND THE HOUSE LIKE SCRUBBING FLOORS OR LIFTING AND MOVING HEAVY FURNITURE: NO
CAN YOU DO LIGHT WORK AROUND THE HOUSE LIKE DUSTING OR WASHING DISHES: YES
CAN YOU HAVE SEXUAL RELATIONS: YES
CAN YOU PARTICIPATE IN STRENOUS SPORTS LIKE SWIMMING, SINGLES TENNIS, FOOTBALL, BASKETBALL, OR SKIING: NO
CAN YOU DO MODERATE WORK AROUND THE HOUSE LIKE VACUUMING, SWEEPING FLOORS OR CARRYING GROCERIES: NO
CAN YOU CLIMB A FLIGHT OF STAIRS OR WALK UP A HILL: YES

## 2024-05-22 ASSESSMENT — PAIN DESCRIPTION - DESCRIPTORS: DESCRIPTORS: ACHING;SHARP

## 2024-05-22 ASSESSMENT — PAIN SCALES - GENERAL: PAINLEVEL_OUTOF10: 8

## 2024-05-22 ASSESSMENT — PAIN - FUNCTIONAL ASSESSMENT: PAIN_FUNCTIONAL_ASSESSMENT: 0-10

## 2024-05-22 NOTE — H&P (VIEW-ONLY)
CPM/PAT Evaluation       Name: Nancy Nuñez (Nancy Nuñez)  /Age: 1950/73 y.o.     In-Person       Chief Complaint: Unilateral primary OA     HPI  Patient is an alert and oriented 73 year old female scheduled for a right total hip arthroplasty on 2024 with Dr. Rodriguez for Unilateral primary OA of the right hip.  PMHX includes HTN, HLD, GERD, hypothyroidism, and anxiety/depression.  P[place] to [facility] [place] for perioperative risk stratification and optimization.     Past Medical History:   Diagnosis Date    Depression, unspecified 2013    Depression    Encounter for gynecological examination (general) (routine) without abnormal findings 10/20/2015    Encounter for well woman exam    Encounter for other screening for malignant neoplasm of breast 10/20/2015    Breast cancer screening    H/O total hip arthroplasty     left    HLD (hyperlipidemia)     HTN (hypertension)     Hx of tonsillectomy     Hypothyroidism     Other conditions influencing health status     History of pregnancy    Personal history of other endocrine, nutritional and metabolic disease     History of hyperlipidemia    Personal history of other mental and behavioral disorders 2016    History of major depression       Past Surgical History:   Procedure Laterality Date    HIP ARTHROPLASTY Left     TONSILLECTOMY       No family history on file.    Allergies   Allergen Reactions    Cat Dander Runny nose    Dog Dander Runny nose    House Dust Runny nose    Mold Runny nose       Medication Documentation Review Audit       Reviewed by Katie Sweeney RN (Registered Nurse) on 24 at 1138      Medication Order Taking? Sig Documenting Provider Last Dose Status     Discontinued 24 1125   alendronate (Fosamax) 70 mg tablet [id number] No Take 1 tablet (70 mg) by mouth every 7 days. Historical Provider, MD Not Taking Active   amLODIPine (Norvasc) 2.5 mg tablet [id number] Yes Take 1 tablet (2.5 mg) by mouth once daily. Historical  MD Anoop 2024 Active     Discontinued 24 1126     Discontinued 24 1127     Discontinued 24 1127     Discontinued 24 1127     Discontinued 24 1128     Discontinued 24 1128     Discontinued 24 1128     Discontinued 24 1129   escitalopram (Lexapro) 10 mg tablet [id number] Yes Take 2 tablets (20 mg) by mouth once daily at bedtime. Historical MD Anoop 2024 Active     Discontinued 24 1130   levothyroxine (Synthroid, Levoxyl) 112 mcg tablet [id number] Yes Take 1 tablet (112 mcg) by mouth once daily. X 6 days, not on Sundays Historical MD Anoop 2024 Active     Discontinued 24 1130   LORazepam (Ativan) 0.5 mg tablet [id number] Yes Take 1 tablet (0.5 mg) by mouth every 6 hours if needed for anxiety. Historical MD Anoop 2024 Active     Discontinued 24 1131     Discontinued 24 1131     Discontinued 24 1131     Discontinued 24 1132     Discontinued 24 1132     Discontinued 24 1133   pantoprazole (ProtoNix) 40 mg EC tablet [id number] Yes Take 1 tablet (40 mg) by mouth once daily. Do not crush, chew, or split.   Patient taking differently: Take 1 tablet (40 mg) by mouth once daily as needed. Do not crush, chew, or split.    Miky Ardon MD 2024 Active   pantoprazole (ProtoNix) 40 mg EC tablet [id number] No Take 1 tablet (40 mg) by mouth 2 times a day for 14 days. Patient may have this medication at home but I am giving her additional medicine for the Helicobacter pylori regiment.   Patient not taking: Reported on 2024    Miky Ardon MD Not Taking  24 0231   polyethylene glycol (Glycolax, Miralax) 17 gram packet [id number] Yes Take 17 g by mouth once daily as needed. Historical Provider, MD Past Week Active     Discontinued 24 1134     Discontinued 24 1135   simvastatin (Zocor) 10 mg tablet [id number] No Take 1 tablet (10 mg) by mouth once daily at bedtime.  Historical Provider, MD Not Taking Active     Discontinued 05/22/24 1135     Discontinued 05/22/24 1135                   Review of Systems   Constitutional: Negative for chills, decreased appetite, diaphoresis, fever and malaise/fatigue.   Eyes:  Negative for blurred vision and double vision.   Cardiovascular:  Negative for chest pain, claudication, cyanosis, dyspnea on exertion, irregular heartbeat, leg swelling, near-syncope and palpitations.   Respiratory:  Negative for cough, hemoptysis, shortness of breath and wheezing.    Endocrine: Negative for cold intolerance, heat intolerance, polydipsia, polyphagia and polyuria.   Gastrointestinal:  Negative for abdominal pain, constipation, diarrhea, dysphagia, nausea and vomiting.   Genitourinary:  Negative for bladder incontinence, dysuria, hematuria, incomplete emptying, nocturia, pelvic pain and urgency.   Neurological:  Negative for headaches, light-headedness, paresthesias, sensory change and weakness.   Psychiatric/Behavioral:  Negative for altered mental status.       Vitals and nursing note reviewed.     Physical exam  Constitutional:       Appearance: Normal appearance. She is normal weight.   HENT:      Head: Normocephalic and atraumatic.      Mouth/Throat:      Mouth: Mucous membranes are moist.      Pharynx: Oropharynx is clear.   Eyes:      Extraocular Movements: Extraocular movements intact.      Conjunctiva/sclera: Conjunctivae normal.      Pupils: Pupils are equal, round, and reactive to light.   Cardiovascular:      Rate and Rhythm: Normal rate and regular rhythm.      Pulses: Normal pulses.      Heart sounds: Normal heart sounds.      No audible carotid bruit  Pulmonary:      Effort: Pulmonary effort is normal.      Breath sounds: Normal breath sounds.   Abdominal:      General: Abdomen is flat. Bowel sounds are normal.      Palpations: Abdomen is soft.   Musculoskeletal:      Cervical back: Normal range of motion and neck supple.   Skin:     General:  "Skin is warm and dry.      Capillary Refill: Capillary refill takes less than 2 seconds.   Neurological:      General: No focal deficit present.      Mental Status: She is alert and oriented to person, place, and time. Mental status is at baseline.   Psychiatric:         Mood and Affect: Mood anxious.         Behavior: Behavior normal.         Thought Content: Thought content normal.         Judgment: Judgment normal.     Vitals and nursing note reviewed. Physical exam within normal limits.     Visit Vitals  /77   Pulse 89   Temp 36.7 °C (98.1 °F)   Resp 16   Ht 1.676 m (5' 6\")   Wt 59.9 kg (132 lb 0.9 oz)   SpO2 96%   BMI 21.31 kg/m²   Smoking Status Former   BSA 1.67 m²      DASI Risk Score      Flowsheet Row Most Recent Value   DASI SCORE 26.7   METS Score (Will be calculated only when all the questions are answered) 6          Caprini DVT Assessment      Flowsheet Row Most Recent Value   DVT Score 8   Current Status Elective major lower extremity arthroplasty   Age 60-75 years   BMI 30 or less          Modified Frailty Index      Flowsheet Row Most Recent Value   Modified Frailty Index Calculator .0909          CHADS2 Stroke Risk  Current as of 36 minutes ago        N/A 3 to 100%: High Risk   2 to < 3%: Medium Risk   0 to < 2%: Low Risk     Last Change: N/A          This score determines the patient's risk of having a stroke if the patient has atrial fibrillation.        This score is not applicable to this patient. Components are not calculated.          Revised Cardiac Risk Index      Flowsheet Row Most Recent Value   Revised Cardiac Risk Calculator 0          Apfel Simplified Score    No data to display       Risk Analysis Index Results This Encounter    No data found in the last 1 encounters.       Stop Bang Score      Flowsheet Row Most Recent Value   Do you snore loudly? 0   Do you often feel tired or fatigued after your sleep? 1   Has anyone ever observed you stop breathing in your sleep? 0   Do you " have or are you being treated for high blood pressure? 0   Recent BMI (Calculated) 21.3   Is BMI greater than 35 kg/m2? 0=No   Age older than 50 years old? 1=Yes   Is your neck circumference greater than 17 inches (Male) or 16 inches (Female)? 0   Gender - Male 0=No   STOP-BANG Total Score 2          Assessment & Plan:    Neuro:  No diagnosis or significant findings on chart review or clinical presentation and evaluation.     HEENT/Airway:  No diagnosis or significant findings on chart review or clinical presentation and evaluation.   STOP-BANG Score-2 points low risk for LISA    Mallampati::  II    TM distance::  >3 FB    Neck ROM::  Full  Left upper bridge, Crowns x 1    Cardiovascular:  No significant findings on chart review or clinical presentation and evaluation.   History of Hypertension-Managed with Amlodipine. BP in /77  History of Hyperlipidemia-Managed with Simvastatin  METS: 6  RCRI: 0 points, 3.9%  risk for postoperative MACE   EDGAR: 0.2% risk for postoperative MACE  EKG -NSR rate 79. No acute changes    Pulmonary:  No diagnosis or significant findings on chart review or clinical presentation and evaluation.   ARISCAT: <26 points, 1.6% risk of in-hospital postoperative pulmonary complication  PRODIGY: Moderate risk for opioid induced respiratory depression  Smoking History-quit smoking 50 years ago, Was an occasional smoker for 2 years    Renal:  No diagnosis or significant findings on chart review or clinical presentation and evaluation, however, the patient is at increased risk of perioperative renal complications secondary to age>/= 56 and HTN. Preventative measures include BP monitoring, medication compliance, and hydration management.   CMP reviewed. Na+ low at 131. Dr Rodriguez/Dr Langford from anesthesia notified. Ok to proceed with surgery. CMP results faxed to PCP. Dr Escobedo [facility].     Endocrine:  No significant findings on chart review or clinical presentation and evaluation.   History  "of Hypothyroidism-Managed with Levothyroxine  TSH completed 2/6/2024-1.010  KMF8G-2.4%    Hematologic:  Hgb 13.4  CBC reviewed  Caprini Score 8, patient at High risk for postoperative DVT. Pt supplied education/VTE handout    Gastrointestinal:   No significant findings on chart review or clinical presentation and evaluation.   History of GERD-Managed with Pantoprazole  Alcohol use-denies  Drug use-denies    Infectious disease:   No diagnosis or significant findings on chart review or clinical presentation and evaluation.   Prescription provided for CHG body wash and dental rinse. CHG use instructions reviewed and provided to patient.  Staph screen collected-negative    Musculoskeletal:   No diagnosis or significant findings on chart review or clinical presentation and evaluation.   JHFRAT score-7 points moderate risk for falls    Anesthesia:  Reports post anesthesia \"Brain fog\" that lasted for months per patient's   No family history of anesthesia complications    Labs & Imaging ordered:  CBC, CMP, HBA1C, MRSA, EKG  Nickel/metal allergy-denies  Shellfish allergy-denies    Overall, patient at low/moderate risk for the scheduled surgery. Discussed with patient medication instructions, NPO guidelines, and any questions or concerns.  Face to face time-30 minutes    Patient was sent to have XR completed that was previously ordered by ortho.   "

## 2024-05-22 NOTE — PREPROCEDURE INSTRUCTIONS
Medication List            Accurate as of May 22, 2024 11:55 AM. Always use your most recent med list.                alendronate 70 mg tablet  Commonly known as: Fosamax  Medication Adjustments for Surgery: Continue until night before surgery  Notes to patient: Last dose preoperatively 6/4/2024     amLODIPine 2.5 mg tablet  Commonly known as: Norvasc  Medication Adjustments for Surgery: Take morning of surgery with sip of water, no other fluids     chlorhexidine 0.12 % solution  Commonly known as: Peridex  Use 15 mL in the mouth or throat once daily for 2 doses. 15 ML night before surgery and 15 ML morning of surgery. Swish and spit     escitalopram 10 mg tablet  Commonly known as: Lexapro  Medication Adjustments for Surgery: Take morning of surgery with sip of water, no other fluids     levothyroxine 112 mcg tablet  Commonly known as: Synthroid, Levoxyl  Medication Adjustments for Surgery: Take morning of surgery with sip of water, no other fluids     LORazepam 0.5 mg tablet  Commonly known as: Ativan  Medication Adjustments for Surgery: Take morning of surgery with sip of water, no other fluids     * pantoprazole 40 mg EC tablet  Commonly known as: ProtoNix  Take 1 tablet (40 mg) by mouth once daily. Do not crush, chew, or split.  Medication Adjustments for Surgery: Take morning of surgery with sip of water, no other fluids     * pantoprazole 40 mg EC tablet  Commonly known as: ProtoNix  Take 1 tablet (40 mg) by mouth 2 times a day for 14 days. Patient may have this medication at home but I am giving her additional medicine for the Helicobacter pylori regiment.  Medication Adjustments for Surgery: Take morning of surgery with sip of water, no other fluids     polyethylene glycol 17 gram packet  Commonly known as: Glycolax, Miralax  Medication Adjustments for Surgery: Continue until night before surgery  Notes to patient: Last dose preoperatively 6/4/2024     simvastatin 10 mg tablet  Commonly known as:  Zocor  Medication Adjustments for Surgery: Take morning of surgery with sip of water, no other fluids           * This list has 2 medication(s) that are the same as other medications prescribed for you. Read the directions carefully, and ask your doctor or other care provider to review them with you.                NPO Instructions:     Do not eat any food after midnight the night before your surgery/procedure.  You may have clear liquids until TWO hours before surgery/procedure. This includes water, black tea/coffee, (no milk or cream) apple juice and electrolyte drinks (Gatorade).  You may chew gum up to TWO hours before your surgery/procedure.     Additional Instructions:      Seven/Six Days before Surgery:  We have sent a prescription for CHG 0.12% mouthwash to your preferred pharmacy.  If you have not already, Please  your prescription and start using the day before before surgery.  Follow the instruction sheet provided to you at your CPM/PAT appointment. Please contact [facility] if you do not receive your CHG mouthwash prescription.   Review your medication instructions, stop indicated medications  Five Days before Surgery:  Review your medication instructions, stop indicated medications  Begin using your Hibiclens  Three Days before Surgery:  Review your medication instructions, stop indicated medications  The Day before Surgery:  Review your medication instructions, stop indicated medications  You will be contacted regarding the time of your arrival to facility and surgery time  Do not eat any food after Midnight  Day of Surgery:  Review your medication instructions, take indicated medications  If you have diabetes, please check your fasting blood sugar upon awakening.  If fasting blood sugar is <80 mg/dl, drink 100 ml of apple juice, time limit of 2 hours before  You may have clear liquids until TWO hours before surgery/procedure.  This includes water, black tea/coffee, (no milk or cream) apple juice  and electrolyte drinks (Gatorade)  You may chew gum up to TWO hours before your surgery/procedure  Wear  comfortable loose fitting clothing  Do not use moisturizers, creams, lotions or perfume  All jewelry and valuables should be left at home     CONTACT SURGEON'S OFFICE IF YOU DEVELOP:  * Fever = 100.4 F   * New respiratory symptoms (e.g. cough, shortness of breath, respiratory distress, sore throat)  * Recent loss of taste or smell  *Flu like symptoms such as headache, fatigue or gastrointestinal symptoms  * You develop any open sores, shingles, burning or painful urination   AND/OR:  * You no longer wish to have the surgery.  * Any other personal circumstances change that may lead to the need to cancel or defer this surgery.  *You were admitted to any hospital within one week of your planned procedure.     SMOKING:  *Quitting smoking can make a huge difference to your health and recovery from surgery.    *If you need help with quitting, call 5-691-QUIT-NOW.     THE DAY BEFORE SURGERY:  *Do not eat any food after midnight the night before your surgery.   *You may have up to TEN OUNCES of clear liquids until TWO hours before your instructed ARRIVAL TIME to hospital. This includes water, black tea/coffee, (no milk or cream) apple juice, clear broth and electrolyte drinks (Gatorade). Please avoid clear liquids that are red in color.   *You may chew gum/mints up to TWO hours before your surgery/procedure.     SURGICAL TIME:  *You will be contacted between 2 p.m. and 3 p.m. the business day before your surgery with your arrival time.  *If you haven't received a call by 3pm, call [phone]  *Scheduled surgery times may change and you will be notified if this occurs-check your personal voicemail for any updates.     ON THE MORNING OF SURGERY:  *Wear comfortable, loose fitting clothing.   *Do not use moisturizers, creams, lotions or perfume.  *All jewelry and valuables should be left at home.  *Prosthetic devices  such as contact lenses, hearing aids, dentures, eyelash extensions, hairpins and body piercing must be removed before surgery.     BRING WITH YOU:  *Photo ID and insurance card  *Current list of medications and allergies  *Pacemaker/Defibrillator/Heart stent cards  *CPAP machine and mask  *Slings/splints/crutches  *Copy of your complete Advanced Directive/DHPOA-if applicable  *Neurostimulator implant remote     PARKING AND ARRIVAL:  *Check in at the Main Entrance desk and let them know you are here for surgery.     IF YOU ARE HAVING OUTPATIENT/SAME DAY SURGERY:  *A responsible adult MUST accompany you at the time of discharge and stay with you for 24 hours after your surgery.  *You may NOT drive yourself home after surgery.  *You may use a taxi or ride sharing service ([employer], Uber) to return home ONLY if you are accompanied by a friend or family member.  *Instructions for resuming your medications will be provided by your surgeon.     Thank you for coming to Pre Admission testing.      If I have prescribe medication please don't forget to  at your pharmacy.      Any questions about today's visit call [phone] and leave a message in the general mailbox.     Patient instructed to ambulate as soon as possible postoperatively to decrease thromboembolic risk.     Kvng Monroy, APRN-CNP     Thank you for visiting [facility].  If you have any changes to your health condition, please call the surgeons office to alert them and give them details of your symptoms.        Preoperative Fasting Guidelines     Why must I stop eating and drinking near surgery time?  With sedation, food or liquid in your stomach can enter your lungs causing serious complications  Increases nausea and vomiting     When do I need to stop eating and drinking before my surgery?  Do not eat any food after midnight the night before your surgery/procedure.  You may have up to TEN ounces of clear liquid until TWO hours  before your instructed arrival time to the hospital.  This includes water, black tea/coffee, (no milk or cream) apple juice, and electrolyte drinks (Gatorade)  You may chew gum until TWO hours before your surgery/procedure        Additional Instructions:      The Day before Surgery:  -Review your medication instructions, stop indicated medications  -You will be contacted in the evening regarding the time of your arrival to facility and surgery time     Day of Surgery:  -Review your medication instructions, take indicated medications  -Wear comfortable loose fitting clothing  -Do not use moisturizers, creams, lotions or perfume  -All jewelry and valuables should be left at home                   Preoperative Brain Exercises     What are brain exercises?  A brain exercise is any activity that engages your thinking (cognitive) skills.     What types of activities are considered brain exercises?  Jigsaw puzzles, crossword puzzles, word jumble, memory games, word search, and many more.  Many can be found free online or on your phone via a mobile [place].     Why should I do brain exercises before my surgery?  More recent research has shown brain exercise before surgery can lower the risk of postoperative delirium (confusion) which can be especially important for older adults.  Patients who did brain exercises for 5 to 10 hours the days before surgery, cut their risk of postoperative delirium in half up to 1 week after surgery.                         [facility]     Preoperative Deep Breathing Exercises     Why it is important to do deep breathing exercises before my surgery?  Deep breathing exercises strengthen your breathing muscles.  This helps you to recover after your surgery and decreases the chance of breathing complications.        How are the deep breathing exercises done?  Sit straight with your back supported.  Breathe in deeply and slowly through your nose. Your lower rib cage should  expand and your abdomen may move forward.  Hold that breath for 3 to 5 seconds.  Breathe out through pursed lips, slowly and completely.  Rest and repeat 10 times every hour while awake.  Rest longer if you become dizzy or lightheaded.                      [facility]     Preoperative Deep Breathing Exercises     Why it is important to do deep breathing exercises before my surgery?  Deep breathing exercises strengthen your breathing muscles.  This helps you to recover after your surgery and decreases the chance of breathing complications.        How are the deep breathing exercises done?  Sit straight with your back supported.  Breathe in deeply and slowly through your nose. Your lower rib cage should expand and your abdomen may move forward.  Hold that breath for 3 to 5 seconds.  Breathe out through pursed lips, slowly and completely.  Rest and repeat 10 times every hour while awake.  Rest longer if you become dizzy or lightheaded.        Patient Information: Incentive Spirometer  What is an incentive spirometer?  An incentive spirometer is a device used before and after surgery to “exercise” your lungs.  It helps you to take deeper breaths to expand your lungs.  Below is an example of a basic incentive spirometer.  The device you receive may differ slightly but they all function the same.    Why do I need to use an incentive spirometer?  Using your incentive spirometer prepares your lungs for surgery and helps prevent lung problems after surgery.  How do I use my incentive spirometer?  When you're using your incentive spirometer, make sure to breathe through your mouth. If you breathe through your nose, the incentive spirometer won't work properly. You can hold your nose if you have trouble.  If you feel dizzy at any time, stop and rest. Try again at a later time.  Follow the steps below:  Set up your incentive spirometer, expand the flexible tubing and connect to the outlet.  Sit upright  in a chair or bed. Hold the incentive spirometer at eye level.   Put the mouthpiece in your mouth and close your lips tightly around it. Slowly breathe out (exhale) completely.  Breathe in (inhale) slowly through your mouth as deeply as you can. As you take a breath, you will see the piston rise inside the large column. While the piston rises, the indicator should move upwards. It should stay in between the 2 arrows (see Figure).  Try to get the piston as high as you can, while keeping the indicator between the arrows.   If the indicator doesn't stay between the arrows, you're breathing either too fast or too slow.  When you get it as high as you can, hold your breath for 10 seconds, or as long as possible. While you're holding your breath, the piston will slowly fall to the base of the spirometer.  Once the piston reaches the bottom of the spirometer, breathe out slowly through your mouth. Rest for a few seconds.  Repeat 10 times. Try to get the piston to the same level with each breath.  Repeat every hour while awake  You can carefully clean the outside of the mouthpiece with an alcohol wipe or soap and water.       Patient and Family Education             Ways You Can Help Prevent Blood Clots                    This handout explains some simple things you can do to help prevent blood clots.      Blood clots are blockages that can form in the body's veins. When a blood clot forms in your deep veins, it may be called a deep vein thrombosis, or DVT for short. Blood clots can happen in any part of the body where blood flows, but they are most common in the arms and legs. If a piece of a blood clot breaks free and travels to the lungs, it is called a pulmonary embolus (PE). A PE can be a very serious problem.         Being in the hospital or having surgery can raise your chances of getting a blood clot because you may not be well enough to move around as much as you normally do.         Ways you can help prevent blood  clots in the hospital           Wearing SCDs. SCDs stands for Sequential Compression Devices.   SCDs are special sleeves that wrap around your legs  They attach to a pump that fills them with air to gently squeeze your legs every few minutes.   This helps return the blood in your legs to your heart.   SCDs should only be taken off when walking or bathing.   SCDs may not be comfortable, but they can help save your life.                                            Wearing compression stockings - if your doctor orders them. These special snug fitting stockings gently squeeze your legs to help blood flow.       Walking. Walking helps move the blood in your legs.   If your doctor says it is ok, try walking the halls at least   5 times a day. Ask us to help you get up, so you don't fall.      Taking any blood thinning medicines your doctor orders.        Page 1 of 2            [facility]; 3/23   Ways you can help prevent blood clots at home         Wearing compression stockings - if your doctor orders them. ? Walking - to help move the blood in your legs.       Taking any blood thinning medicines your doctor orders.      Signs of a blood clot or PE        Tell your doctor or nurse know right away if you have of the problems listed below.    If you are at home, seek medical care right away. Call 911 for chest pain or problems breathing.                Signs of a blood clot (DVT) - such as pain,  swelling, redness or warmth in your arm or leg      Signs of a pulmonary embolism (PE) - such as chest     pain or feeling short of breath

## 2024-05-22 NOTE — CPM/PAT H&P
CPM/PAT Evaluation       Name: Nancy Nuñez (Nancy Nuñez)  /Age: 1950/73 y.o.     In-Person       Chief Complaint: Unilateral primary OA     HPI  Patient is an alert and oriented 73 year old female scheduled for a right total hip arthroplasty on 2024 with Dr. Rodriguez for Unilateral primary OA of the right hip.  PMHX includes HTN, HLD, GERD, hypothyroidism, and anxiety/depression.  Presents to [facility] PAT today for perioperative risk stratification and optimization.     Past Medical History:   Diagnosis Date    Depression, unspecified 2013    Depression    Encounter for gynecological examination (general) (routine) without abnormal findings 10/20/2015    Encounter for well woman exam    Encounter for other screening for malignant neoplasm of breast 10/20/2015    Breast cancer screening    H/O total hip arthroplasty     left    HLD (hyperlipidemia)     HTN (hypertension)     Hx of tonsillectomy     Hypothyroidism     Other conditions influencing health status     History of pregnancy    Personal history of other endocrine, nutritional and metabolic disease     History of hyperlipidemia    Personal history of other mental and behavioral disorders 2016    History of major depression       Past Surgical History:   Procedure Laterality Date    HIP ARTHROPLASTY Left     TONSILLECTOMY       No family history on file.    Allergies   Allergen Reactions    Cat Dander Runny nose    Dog Dander Runny nose    House Dust Runny nose    Mold Runny nose       Medication Documentation Review Audit       Reviewed by Katie Sweeney RN (Registered Nurse) on 24 at 1138      Medication Order Taking? Sig Documenting Provider Last Dose Status     Discontinued 24 1125   alendronate (Fosamax) 70 mg tablet [id number] No Take 1 tablet (70 mg) by mouth every 7 days. Historical Provider, MD Not Taking Active   amLODIPine (Norvasc) 2.5 mg tablet [id number] Yes Take 1 tablet (2.5 mg) by mouth once daily. Historical  MD Anoop 2024 Active     Discontinued 24 1126     Discontinued 24 1127     Discontinued 24 1127     Discontinued 24 1127     Discontinued 24 1128     Discontinued 24 1128     Discontinued 24 1128     Discontinued 24 1129   escitalopram (Lexapro) 10 mg tablet [id number] Yes Take 2 tablets (20 mg) by mouth once daily at bedtime. Historical MD Anoop 2024 Active     Discontinued 24 1130   levothyroxine (Synthroid, Levoxyl) 112 mcg tablet [id number] Yes Take 1 tablet (112 mcg) by mouth once daily. X 6 days, not on Sundays Historical MD Anoop 2024 Active     Discontinued 24 1130   LORazepam (Ativan) 0.5 mg tablet [id number] Yes Take 1 tablet (0.5 mg) by mouth every 6 hours if needed for anxiety. Historical MD Anoop 2024 Active     Discontinued 24 1131     Discontinued 24 1131     Discontinued 24 1131     Discontinued 24 1132     Discontinued 24 1132     Discontinued 24 1133   pantoprazole (ProtoNix) 40 mg EC tablet [id number] Yes Take 1 tablet (40 mg) by mouth once daily. Do not crush, chew, or split.   Patient taking differently: Take 1 tablet (40 mg) by mouth once daily as needed. Do not crush, chew, or split.    Miky Ardon MD 2024 Active   pantoprazole (ProtoNix) 40 mg EC tablet [id number] No Take 1 tablet (40 mg) by mouth 2 times a day for 14 days. Patient may have this medication at home but I am giving her additional medicine for the Helicobacter pylori regiment.   Patient not taking: Reported on 2024    Miky Ardon MD Not Taking  24 7093   polyethylene glycol (Glycolax, Miralax) 17 gram packet [id number] Yes Take 17 g by mouth once daily as needed. Historical Provider, MD Past Week Active     Discontinued 24 1134     Discontinued 24 1135   simvastatin (Zocor) 10 mg tablet [id number] No Take 1 tablet (10 mg) by mouth once daily at bedtime.  Historical Provider, MD Not Taking Active     Discontinued 05/22/24 1135     Discontinued 05/22/24 1135                   Review of Systems   Constitutional: Negative for chills, decreased appetite, diaphoresis, fever and malaise/fatigue.   Eyes:  Negative for blurred vision and double vision.   Cardiovascular:  Negative for chest pain, claudication, cyanosis, dyspnea on exertion, irregular heartbeat, leg swelling, near-syncope and palpitations.   Respiratory:  Negative for cough, hemoptysis, shortness of breath and wheezing.    Endocrine: Negative for cold intolerance, heat intolerance, polydipsia, polyphagia and polyuria.   Gastrointestinal:  Negative for abdominal pain, constipation, diarrhea, dysphagia, nausea and vomiting.   Genitourinary:  Negative for bladder incontinence, dysuria, hematuria, incomplete emptying, nocturia, pelvic pain and urgency.   Neurological:  Negative for headaches, light-headedness, paresthesias, sensory change and weakness.   Psychiatric/Behavioral:  Negative for altered mental status.       Vitals and nursing note reviewed.     Physical exam  Constitutional:       Appearance: Normal appearance. She is normal weight.   HENT:      Head: Normocephalic and atraumatic.      Mouth/Throat:      Mouth: Mucous membranes are moist.      Pharynx: Oropharynx is clear.   Eyes:      Extraocular Movements: Extraocular movements intact.      Conjunctiva/sclera: Conjunctivae normal.      Pupils: Pupils are equal, round, and reactive to light.   Cardiovascular:      Rate and Rhythm: Normal rate and regular rhythm.      Pulses: Normal pulses.      Heart sounds: Normal heart sounds.      No audible carotid bruit  Pulmonary:      Effort: Pulmonary effort is normal.      Breath sounds: Normal breath sounds.   Abdominal:      General: Abdomen is flat. Bowel sounds are normal.      Palpations: Abdomen is soft.   Musculoskeletal:      Cervical back: Normal range of motion and neck supple.   Skin:     General:  "Skin is warm and dry.      Capillary Refill: Capillary refill takes less than 2 seconds.   Neurological:      General: No focal deficit present.      Mental Status: She is alert and oriented to person, place, and time. Mental status is at baseline.   Psychiatric:         Mood and Affect: Mood anxious.         Behavior: Behavior normal.         Thought Content: Thought content normal.         Judgment: Judgment normal.     Vitals and nursing note reviewed. Physical exam within normal limits.     Visit Vitals  /77   Pulse 89   Temp 36.7 °C (98.1 °F)   Resp 16   Ht 1.676 m (5' 6\")   Wt 59.9 kg (132 lb 0.9 oz)   SpO2 96%   BMI 21.31 kg/m²   Smoking Status Former   BSA 1.67 m²      DASI Risk Score      Flowsheet Row Most Recent Value   DASI SCORE 26.7   METS Score (Will be calculated only when all the questions are answered) 6          Caprini DVT Assessment      Flowsheet Row Most Recent Value   DVT Score 8   Current Status Elective major lower extremity arthroplasty   Age 60-75 years   BMI 30 or less          Modified Frailty Index      Flowsheet Row Most Recent Value   Modified Frailty Index Calculator .0909          CHADS2 Stroke Risk  Current as of 36 minutes ago        N/A 3 to 100%: High Risk   2 to < 3%: Medium Risk   0 to < 2%: Low Risk     Last Change: N/A          This score determines the patient's risk of having a stroke if the patient has atrial fibrillation.        This score is not applicable to this patient. Components are not calculated.          Revised Cardiac Risk Index      Flowsheet Row Most Recent Value   Revised Cardiac Risk Calculator 0          Apfel Simplified Score    No data to display       Risk Analysis Index Results This Encounter    No data found in the last 1 encounters.       Stop Bang Score      Flowsheet Row Most Recent Value   Do you snore loudly? 0   Do you often feel tired or fatigued after your sleep? 1   Has anyone ever observed you stop breathing in your sleep? 0   Do you " have or are you being treated for high blood pressure? 0   Recent BMI (Calculated) 21.3   Is BMI greater than 35 kg/m2? 0=No   Age older than 50 years old? 1=Yes   Is your neck circumference greater than 17 inches (Male) or 16 inches (Female)? 0   Gender - Male 0=No   STOP-BANG Total Score 2          Assessment & Plan:    Neuro:  No diagnosis or significant findings on chart review or clinical presentation and evaluation.     HEENT/Airway:  No diagnosis or significant findings on chart review or clinical presentation and evaluation.   STOP-BANG Score-2 points low risk for LISA    Mallampati::  II    TM distance::  >3 FB    Neck ROM::  Full  Left upper bridge, Crowns x 1    Cardiovascular:  No significant findings on chart review or clinical presentation and evaluation.   History of Hypertension-Managed with Amlodipine. BP in /77  History of Hyperlipidemia-Managed with Simvastatin  METS: 6  RCRI: 0 points, 3.9%  risk for postoperative MACE   EDGAR: 0.2% risk for postoperative MACE  EKG -NSR rate 79. No acute changes    Pulmonary:  No diagnosis or significant findings on chart review or clinical presentation and evaluation.   ARISCAT: <26 points, 1.6% risk of in-hospital postoperative pulmonary complication  PRODIGY: Moderate risk for opioid induced respiratory depression  Smoking History-quit smoking 50 years ago, Was an occasional smoker for 2 years    Renal:  No diagnosis or significant findings on chart review or clinical presentation and evaluation, however, the patient is at increased risk of perioperative renal complications secondary to age>/= 56 and HTN. Preventative measures include BP monitoring, medication compliance, and hydration management.   CMP reviewed. Na+ low at 131. Dr Rodriguez/Dr Langford from anesthesia notified. Ok to proceed with surgery. CMP results faxed to PCP. Dr Escobedo [facility].     Endocrine:  No significant findings on chart review or clinical presentation and evaluation.   History  "of Hypothyroidism-Managed with Levothyroxine  TSH completed 2/6/2024-1.010  LHO5G-8.4%    Hematologic:  Hgb 13.4  CBC reviewed  Caprini Score 8, patient at High risk for postoperative DVT. Pt supplied education/VTE handout    Gastrointestinal:   No significant findings on chart review or clinical presentation and evaluation.   History of GERD-Managed with Pantoprazole  Alcohol use-denies  Drug use-denies    Infectious disease:   No diagnosis or significant findings on chart review or clinical presentation and evaluation.   Prescription provided for CHG body wash and dental rinse. CHG use instructions reviewed and provided to patient.  Staph screen collected-negative    Musculoskeletal:   No diagnosis or significant findings on chart review or clinical presentation and evaluation.   JHFRAT score-7 points moderate risk for falls    Anesthesia:  Reports post anesthesia \"Brain fog\" that lasted for months per patient's   No family history of anesthesia complications    Labs & Imaging ordered:  CBC, CMP, HBA1C, MRSA, EKG  Nickel/metal allergy-denies  Shellfish allergy-denies    Overall, patient at low/moderate risk for the scheduled surgery. Discussed with patient medication instructions, NPO guidelines, and any questions or concerns.  Face to face time-30 minutes    Patient was sent to have XR completed that was previously ordered by ortho.   "

## 2024-05-24 LAB — STAPHYLOCOCCUS SPEC CULT: NORMAL

## 2024-05-29 ENCOUNTER — TELEPHONE (OUTPATIENT)
Dept: ORTHOPEDIC SURGERY | Facility: HOSPITAL | Age: 74
End: 2024-05-29
Payer: MEDICARE

## 2024-05-29 NOTE — TELEPHONE ENCOUNTER
Please call [phone] at your earliest convenience to discuss details for your upcoming surgery with Dr. Juan David Rodriguez.  I can be reached during normal business hours, Monday through Friday.    Thanks,  Nellie Lewis MBA, BSN, RN-BC  HERNANDEZ WalkerN-RN  [facility]  [facility]  [phone]

## 2024-06-04 PROBLEM — M16.10 HIP ARTHRITIS: Status: ACTIVE | Noted: 2024-06-04

## 2024-06-04 RX ORDER — ACETAMINOPHEN 500 MG
1000 TABLET ORAL EVERY 6 HOURS PRN
Qty: 240 TABLET | Refills: 0 | Status: SHIPPED | OUTPATIENT
Start: 2024-06-04 | End: 2024-07-04

## 2024-06-04 RX ORDER — POLYETHYLENE GLYCOL 3350 17 G/17G
17 POWDER, FOR SOLUTION ORAL DAILY
Qty: 30 PACKET | Refills: 0 | Status: SHIPPED | OUTPATIENT
Start: 2024-06-04 | End: 2024-07-04

## 2024-06-04 RX ORDER — OXYCODONE HYDROCHLORIDE 5 MG/1
5 TABLET ORAL EVERY 6 HOURS PRN
Qty: 28 TABLET | Refills: 0 | Status: SHIPPED | OUTPATIENT
Start: 2024-06-04 | End: 2024-06-11

## 2024-06-04 RX ORDER — TRAMADOL HYDROCHLORIDE 50 MG/1
50 TABLET ORAL EVERY 6 HOURS PRN
Qty: 28 TABLET | Refills: 0 | Status: SHIPPED | OUTPATIENT
Start: 2024-06-04 | End: 2024-06-11

## 2024-06-04 RX ORDER — TRANEXAMIC ACID 650 MG/1
1950 TABLET ORAL ONCE
Status: CANCELLED | OUTPATIENT
Start: 2024-06-04 | End: 2024-06-04

## 2024-06-04 RX ORDER — DOCUSATE SODIUM 100 MG/1
100 CAPSULE, LIQUID FILLED ORAL 2 TIMES DAILY
Qty: 60 CAPSULE | Refills: 0 | Status: SHIPPED | OUTPATIENT
Start: 2024-06-04 | End: 2024-07-04

## 2024-06-04 RX ORDER — ASPIRIN 81 MG/1
81 TABLET ORAL 2 TIMES DAILY
Qty: 60 TABLET | Refills: 0 | Status: SHIPPED | OUTPATIENT
Start: 2024-06-04 | End: 2024-07-04

## 2024-06-04 RX ORDER — PANTOPRAZOLE SODIUM 40 MG/1
40 TABLET, DELAYED RELEASE ORAL DAILY
Qty: 30 TABLET | Refills: 0 | Status: SHIPPED | OUTPATIENT
Start: 2024-06-04 | End: 2024-07-04

## 2024-06-04 RX ORDER — MELOXICAM 15 MG/1
15 TABLET ORAL DAILY
Qty: 30 TABLET | Refills: 0 | Status: SHIPPED | OUTPATIENT
Start: 2024-06-04 | End: 2024-07-04

## 2024-06-05 ENCOUNTER — ANESTHESIA EVENT (OUTPATIENT)
Dept: OPERATING ROOM | Facility: HOSPITAL | Age: 74
End: 2024-06-05
Payer: MEDICARE

## 2024-06-05 ENCOUNTER — HOME HEALTH ADMISSION (OUTPATIENT)
Dept: HOME HEALTH SERVICES | Facility: HOME HEALTH | Age: 74
End: 2024-06-05
Payer: MEDICARE

## 2024-06-05 ENCOUNTER — HOSPITAL ENCOUNTER (OUTPATIENT)
Facility: HOSPITAL | Age: 74
Discharge: HOME HEALTH | End: 2024-06-06
Attending: ORTHOPAEDIC SURGERY | Admitting: ORTHOPAEDIC SURGERY
Payer: MEDICARE

## 2024-06-05 ENCOUNTER — ANESTHESIA (OUTPATIENT)
Dept: OPERATING ROOM | Facility: HOSPITAL | Age: 74
End: 2024-06-05
Payer: MEDICARE

## 2024-06-05 ENCOUNTER — APPOINTMENT (OUTPATIENT)
Dept: RADIOLOGY | Facility: HOSPITAL | Age: 74
End: 2024-06-05
Payer: MEDICARE

## 2024-06-05 DIAGNOSIS — M16.10 HIP ARTHRITIS: Primary | ICD-10-CM

## 2024-06-05 DIAGNOSIS — I10 PRIMARY HYPERTENSION: ICD-10-CM

## 2024-06-05 DIAGNOSIS — M16.11 UNILATERAL PRIMARY OSTEOARTHRITIS, RIGHT HIP: ICD-10-CM

## 2024-06-05 LAB
ANION GAP SERPL CALC-SCNC: 11 MMOL/L (ref 10–20)
BUN SERPL-MCNC: 12 MG/DL (ref 6–23)
CALCIUM SERPL-MCNC: 8.9 MG/DL (ref 8.6–10.3)
CHLORIDE SERPL-SCNC: 97 MMOL/L (ref 98–107)
CO2 SERPL-SCNC: 25 MMOL/L (ref 21–32)
CREAT SERPL-MCNC: 0.76 MG/DL (ref 0.5–1.05)
EGFRCR SERPLBLD CKD-EPI 2021: 82 ML/MIN/1.73M*2
GLUCOSE SERPL-MCNC: 139 MG/DL (ref 74–99)
POTASSIUM SERPL-SCNC: 3.9 MMOL/L (ref 3.5–5.3)
SODIUM SERPL-SCNC: 129 MMOL/L (ref 136–145)

## 2024-06-05 PROCEDURE — 3700000001 HC GENERAL ANESTHESIA TIME - INITIAL BASE CHARGE: Performed by: ORTHOPAEDIC SURGERY

## 2024-06-05 PROCEDURE — 72170 X-RAY EXAM OF PELVIS: CPT | Performed by: RADIOLOGY

## 2024-06-05 PROCEDURE — 7100000001 HC RECOVERY ROOM TIME - INITIAL BASE CHARGE: Performed by: ORTHOPAEDIC SURGERY

## 2024-06-05 PROCEDURE — 3600000005 HC OR TIME - INITIAL BASE CHARGE - PROCEDURE LEVEL FIVE: Performed by: ORTHOPAEDIC SURGERY

## 2024-06-05 PROCEDURE — 2780000003 HC OR 278 NO HCPCS: Performed by: ORTHOPAEDIC SURGERY

## 2024-06-05 PROCEDURE — 72170 X-RAY EXAM OF PELVIS: CPT

## 2024-06-05 PROCEDURE — 97116 GAIT TRAINING THERAPY: CPT | Mod: GP

## 2024-06-05 PROCEDURE — 2720000007 HC OR 272 NO HCPCS: Performed by: ORTHOPAEDIC SURGERY

## 2024-06-05 PROCEDURE — 97110 THERAPEUTIC EXERCISES: CPT | Mod: GP

## 2024-06-05 PROCEDURE — 2500000005 HC RX 250 GENERAL PHARMACY W/O HCPCS: Performed by: ANESTHESIOLOGIST ASSISTANT

## 2024-06-05 PROCEDURE — 97162 PT EVAL MOD COMPLEX 30 MIN: CPT | Mod: GP

## 2024-06-05 PROCEDURE — 2500000004 HC RX 250 GENERAL PHARMACY W/ HCPCS (ALT 636 FOR OP/ED): Performed by: PHYSICIAN ASSISTANT

## 2024-06-05 PROCEDURE — 2500000001 HC RX 250 WO HCPCS SELF ADMINISTERED DRUGS (ALT 637 FOR MEDICARE OP): Performed by: ORTHOPAEDIC SURGERY

## 2024-06-05 PROCEDURE — 97530 THERAPEUTIC ACTIVITIES: CPT | Mod: GP

## 2024-06-05 PROCEDURE — G0378 HOSPITAL OBSERVATION PER HR: HCPCS

## 2024-06-05 PROCEDURE — 2500000001 HC RX 250 WO HCPCS SELF ADMINISTERED DRUGS (ALT 637 FOR MEDICARE OP): Performed by: STUDENT IN AN ORGANIZED HEALTH CARE EDUCATION/TRAINING PROGRAM

## 2024-06-05 PROCEDURE — 94760 N-INVAS EAR/PLS OXIMETRY 1: CPT | Mod: 59

## 2024-06-05 PROCEDURE — 2500000001 HC RX 250 WO HCPCS SELF ADMINISTERED DRUGS (ALT 637 FOR MEDICARE OP): Performed by: HOSPITALIST

## 2024-06-05 PROCEDURE — 80048 BASIC METABOLIC PNL TOTAL CA: CPT | Performed by: PHYSICIAN ASSISTANT

## 2024-06-05 PROCEDURE — 2500000001 HC RX 250 WO HCPCS SELF ADMINISTERED DRUGS (ALT 637 FOR MEDICARE OP): Performed by: PHYSICIAN ASSISTANT

## 2024-06-05 PROCEDURE — 2500000002 HC RX 250 W HCPCS SELF ADMINISTERED DRUGS (ALT 637 FOR MEDICARE OP, ALT 636 FOR OP/ED): Performed by: STUDENT IN AN ORGANIZED HEALTH CARE EDUCATION/TRAINING PROGRAM

## 2024-06-05 PROCEDURE — 2500000004 HC RX 250 GENERAL PHARMACY W/ HCPCS (ALT 636 FOR OP/ED): Performed by: ORTHOPAEDIC SURGERY

## 2024-06-05 PROCEDURE — 36415 COLL VENOUS BLD VENIPUNCTURE: CPT | Performed by: PHYSICIAN ASSISTANT

## 2024-06-05 PROCEDURE — 2500000004 HC RX 250 GENERAL PHARMACY W/ HCPCS (ALT 636 FOR OP/ED): Mod: JZ,MUE | Performed by: STUDENT IN AN ORGANIZED HEALTH CARE EDUCATION/TRAINING PROGRAM

## 2024-06-05 PROCEDURE — 27130 TOTAL HIP ARTHROPLASTY: CPT | Performed by: ORTHOPAEDIC SURGERY

## 2024-06-05 PROCEDURE — C1762 CONN TISS, HUMAN(INC FASCIA): HCPCS | Performed by: ORTHOPAEDIC SURGERY

## 2024-06-05 PROCEDURE — 2500000004 HC RX 250 GENERAL PHARMACY W/ HCPCS (ALT 636 FOR OP/ED): Performed by: ANESTHESIOLOGIST ASSISTANT

## 2024-06-05 PROCEDURE — 2500000001 HC RX 250 WO HCPCS SELF ADMINISTERED DRUGS (ALT 637 FOR MEDICARE OP)

## 2024-06-05 PROCEDURE — 3600000010 HC OR TIME - EACH INCREMENTAL 1 MINUTE - PROCEDURE LEVEL FIVE: Performed by: ORTHOPAEDIC SURGERY

## 2024-06-05 PROCEDURE — C1776 JOINT DEVICE (IMPLANTABLE): HCPCS | Performed by: ORTHOPAEDIC SURGERY

## 2024-06-05 PROCEDURE — 7100000011 HC EXTENDED STAY RECOVERY HOURLY - NURSING UNIT

## 2024-06-05 PROCEDURE — A4649 SURGICAL SUPPLIES: HCPCS | Performed by: ORTHOPAEDIC SURGERY

## 2024-06-05 PROCEDURE — C1713 ANCHOR/SCREW BN/BN,TIS/BN: HCPCS | Performed by: ORTHOPAEDIC SURGERY

## 2024-06-05 PROCEDURE — 7100000002 HC RECOVERY ROOM TIME - EACH INCREMENTAL 1 MINUTE: Performed by: ORTHOPAEDIC SURGERY

## 2024-06-05 PROCEDURE — 3700000002 HC GENERAL ANESTHESIA TIME - EACH INCREMENTAL 1 MINUTE: Performed by: ORTHOPAEDIC SURGERY

## 2024-06-05 PROCEDURE — 2500000005 HC RX 250 GENERAL PHARMACY W/O HCPCS: Performed by: PHYSICIAN ASSISTANT

## 2024-06-05 PROCEDURE — 2500000005 HC RX 250 GENERAL PHARMACY W/O HCPCS: Performed by: STUDENT IN AN ORGANIZED HEALTH CARE EDUCATION/TRAINING PROGRAM

## 2024-06-05 DEVICE — BONE, CHIP, CANCELLOUS, 1.7-10 MM, 15 CC: Type: IMPLANTABLE DEVICE | Site: HIP | Status: FUNCTIONAL

## 2024-06-05 DEVICE — ARTICUL/EZE FEMORAL HEAD DIAMETER 32MM +5 12/14 TAPER
Type: IMPLANTABLE DEVICE | Site: HIP | Status: FUNCTIONAL
Brand: ARTICUL/EZE

## 2024-06-05 DEVICE — SUMMIT FEMORAL STEM 12/14 TAPER TAPER ED W/POROCOAT SIZE 4 STD 140MM
Type: IMPLANTABLE DEVICE | Site: HIP | Status: FUNCTIONAL
Brand: SUMMIT POROCOAT

## 2024-06-05 DEVICE — PINNACLE CANCELLOUS BONE SCREW 6.5MM X 25MM
Type: IMPLANTABLE DEVICE | Site: HIP | Status: FUNCTIONAL
Brand: PINNACLE

## 2024-06-05 DEVICE — PINNACLE HIP SOLUTIONS ALTRX POLYETHYLENE ACETABULAR LINER NEUTRAL 32MM ID 52MM OD
Type: IMPLANTABLE DEVICE | Site: HIP | Status: FUNCTIONAL
Brand: PINNACLE ALTRX

## 2024-06-05 DEVICE — PINNACLE CANCELLOUS BONE SCREW 6.5MM X 30MM
Type: IMPLANTABLE DEVICE | Site: HIP | Status: FUNCTIONAL
Brand: PINNACLE

## 2024-06-05 DEVICE — PINNACLE GRIPTION ACETABULAR SHELL SECTOR 52MM OD
Type: IMPLANTABLE DEVICE | Site: HIP | Status: FUNCTIONAL
Brand: PINNACLE GRIPTION

## 2024-06-05 RX ORDER — SODIUM CHLORIDE, SODIUM LACTATE, POTASSIUM CHLORIDE, CALCIUM CHLORIDE 600; 310; 30; 20 MG/100ML; MG/100ML; MG/100ML; MG/100ML
75 INJECTION, SOLUTION INTRAVENOUS CONTINUOUS
Status: DISCONTINUED | OUTPATIENT
Start: 2024-06-05 | End: 2024-06-05

## 2024-06-05 RX ORDER — POLYETHYLENE GLYCOL 3350 17 G/17G
17 POWDER, FOR SOLUTION ORAL DAILY
Status: DISCONTINUED | OUTPATIENT
Start: 2024-06-06 | End: 2024-06-06 | Stop reason: HOSPADM

## 2024-06-05 RX ORDER — BISACODYL 10 MG/1
10 SUPPOSITORY RECTAL DAILY PRN
Status: DISCONTINUED | OUTPATIENT
Start: 2024-06-05 | End: 2024-06-06 | Stop reason: HOSPADM

## 2024-06-05 RX ORDER — NALOXONE HYDROCHLORIDE 0.4 MG/ML
0.2 INJECTION, SOLUTION INTRAMUSCULAR; INTRAVENOUS; SUBCUTANEOUS EVERY 5 MIN PRN
Status: DISCONTINUED | OUTPATIENT
Start: 2024-06-05 | End: 2024-06-06 | Stop reason: HOSPADM

## 2024-06-05 RX ORDER — AMLODIPINE BESYLATE 2.5 MG/1
2.5 TABLET ORAL DAILY
Status: DISCONTINUED | OUTPATIENT
Start: 2024-06-06 | End: 2024-06-06 | Stop reason: HOSPADM

## 2024-06-05 RX ORDER — ONDANSETRON HYDROCHLORIDE 2 MG/ML
4 INJECTION, SOLUTION INTRAVENOUS ONCE AS NEEDED
Status: DISCONTINUED | OUTPATIENT
Start: 2024-06-05 | End: 2024-06-05 | Stop reason: HOSPADM

## 2024-06-05 RX ORDER — OXYCODONE HYDROCHLORIDE 5 MG/1
10 TABLET ORAL EVERY 4 HOURS PRN
Status: DISCONTINUED | OUTPATIENT
Start: 2024-06-05 | End: 2024-06-06 | Stop reason: HOSPADM

## 2024-06-05 RX ORDER — MIDAZOLAM HYDROCHLORIDE 1 MG/ML
INJECTION, SOLUTION INTRAMUSCULAR; INTRAVENOUS AS NEEDED
Status: DISCONTINUED | OUTPATIENT
Start: 2024-06-05 | End: 2024-06-05

## 2024-06-05 RX ORDER — LORAZEPAM 0.5 MG/1
0.5 TABLET ORAL EVERY 6 HOURS PRN
Status: DISCONTINUED | OUTPATIENT
Start: 2024-06-05 | End: 2024-06-05

## 2024-06-05 RX ORDER — FENTANYL CITRATE 50 UG/ML
50 INJECTION, SOLUTION INTRAMUSCULAR; INTRAVENOUS EVERY 5 MIN PRN
Status: DISCONTINUED | OUTPATIENT
Start: 2024-06-05 | End: 2024-06-05 | Stop reason: HOSPADM

## 2024-06-05 RX ORDER — CYCLOBENZAPRINE HCL 10 MG
5 TABLET ORAL 3 TIMES DAILY PRN
Status: DISCONTINUED | OUTPATIENT
Start: 2024-06-05 | End: 2024-06-06 | Stop reason: HOSPADM

## 2024-06-05 RX ORDER — LIDOCAINE HYDROCHLORIDE 10 MG/ML
0.1 INJECTION INFILTRATION; PERINEURAL ONCE
Status: DISCONTINUED | OUTPATIENT
Start: 2024-06-05 | End: 2024-06-05 | Stop reason: HOSPADM

## 2024-06-05 RX ORDER — DIPHENHYDRAMINE HCL 12.5MG/5ML
12.5 LIQUID (ML) ORAL EVERY 6 HOURS PRN
Status: DISCONTINUED | OUTPATIENT
Start: 2024-06-05 | End: 2024-06-06 | Stop reason: HOSPADM

## 2024-06-05 RX ORDER — ESCITALOPRAM OXALATE 10 MG/1
20 TABLET ORAL NIGHTLY
Status: DISCONTINUED | OUTPATIENT
Start: 2024-06-05 | End: 2024-06-06 | Stop reason: HOSPADM

## 2024-06-05 RX ORDER — ONDANSETRON HYDROCHLORIDE 2 MG/ML
INJECTION, SOLUTION INTRAVENOUS AS NEEDED
Status: DISCONTINUED | OUTPATIENT
Start: 2024-06-05 | End: 2024-06-05

## 2024-06-05 RX ORDER — HYDRALAZINE HYDROCHLORIDE 20 MG/ML
5 INJECTION INTRAMUSCULAR; INTRAVENOUS EVERY 30 MIN PRN
Status: DISCONTINUED | OUTPATIENT
Start: 2024-06-05 | End: 2024-06-05 | Stop reason: HOSPADM

## 2024-06-05 RX ORDER — LABETALOL HYDROCHLORIDE 5 MG/ML
INJECTION, SOLUTION INTRAVENOUS AS NEEDED
Status: DISCONTINUED | OUTPATIENT
Start: 2024-06-05 | End: 2024-06-05

## 2024-06-05 RX ORDER — CEFAZOLIN SODIUM 2 G/100ML
2 INJECTION, SOLUTION INTRAVENOUS EVERY 8 HOURS
Status: COMPLETED | OUTPATIENT
Start: 2024-06-05 | End: 2024-06-06

## 2024-06-05 RX ORDER — SODIUM CHLORIDE 1000 MG
1 TABLET, SOLUBLE MISCELLANEOUS 3 TIMES DAILY
Status: COMPLETED | OUTPATIENT
Start: 2024-06-05 | End: 2024-06-06

## 2024-06-05 RX ORDER — SODIUM CHLORIDE 9 MG/ML
100 INJECTION, SOLUTION INTRAVENOUS CONTINUOUS
Status: DISCONTINUED | OUTPATIENT
Start: 2024-06-05 | End: 2024-06-06 | Stop reason: HOSPADM

## 2024-06-05 RX ORDER — ONDANSETRON HYDROCHLORIDE 2 MG/ML
4 INJECTION, SOLUTION INTRAVENOUS EVERY 8 HOURS PRN
Status: DISCONTINUED | OUTPATIENT
Start: 2024-06-05 | End: 2024-06-06 | Stop reason: HOSPADM

## 2024-06-05 RX ORDER — HYDRALAZINE HYDROCHLORIDE 20 MG/ML
INJECTION INTRAMUSCULAR; INTRAVENOUS AS NEEDED
Status: DISCONTINUED | OUTPATIENT
Start: 2024-06-05 | End: 2024-06-05

## 2024-06-05 RX ORDER — SCOLOPAMINE TRANSDERMAL SYSTEM 1 MG/1
1 PATCH, EXTENDED RELEASE TRANSDERMAL
Status: DISCONTINUED | OUTPATIENT
Start: 2024-06-05 | End: 2024-06-06 | Stop reason: HOSPADM

## 2024-06-05 RX ORDER — METOCLOPRAMIDE HYDROCHLORIDE 5 MG/ML
10 INJECTION INTRAMUSCULAR; INTRAVENOUS ONCE
Status: COMPLETED | OUTPATIENT
Start: 2024-06-05 | End: 2024-06-05

## 2024-06-05 RX ORDER — BUPIVACAINE HYDROCHLORIDE 7.5 MG/ML
INJECTION, SOLUTION INTRASPINAL AS NEEDED
Status: DISCONTINUED | OUTPATIENT
Start: 2024-06-05 | End: 2024-06-05

## 2024-06-05 RX ORDER — DOCUSATE SODIUM 100 MG/1
100 CAPSULE, LIQUID FILLED ORAL 2 TIMES DAILY
Status: DISCONTINUED | OUTPATIENT
Start: 2024-06-05 | End: 2024-06-06 | Stop reason: HOSPADM

## 2024-06-05 RX ORDER — ROPIVACAINE/EPI/CLONIDINE/KET 2.46-0.005
SYRINGE (ML) INJECTION AS NEEDED
Status: DISCONTINUED | OUTPATIENT
Start: 2024-06-05 | End: 2024-06-05 | Stop reason: HOSPADM

## 2024-06-05 RX ORDER — ASPIRIN 81 MG/1
81 TABLET ORAL 2 TIMES DAILY
Status: DISCONTINUED | OUTPATIENT
Start: 2024-06-05 | End: 2024-06-06 | Stop reason: HOSPADM

## 2024-06-05 RX ORDER — FENTANYL CITRATE 50 UG/ML
INJECTION, SOLUTION INTRAMUSCULAR; INTRAVENOUS AS NEEDED
Status: DISCONTINUED | OUTPATIENT
Start: 2024-06-05 | End: 2024-06-05

## 2024-06-05 RX ORDER — LORAZEPAM 0.5 MG/1
0.5 TABLET ORAL EVERY 6 HOURS PRN
Status: DISCONTINUED | OUTPATIENT
Start: 2024-06-05 | End: 2024-06-06 | Stop reason: HOSPADM

## 2024-06-05 RX ORDER — ALBUTEROL SULFATE 0.83 MG/ML
2.5 SOLUTION RESPIRATORY (INHALATION) ONCE AS NEEDED
Status: DISCONTINUED | OUTPATIENT
Start: 2024-06-05 | End: 2024-06-05 | Stop reason: HOSPADM

## 2024-06-05 RX ORDER — ONDANSETRON 4 MG/1
4 TABLET, ORALLY DISINTEGRATING ORAL EVERY 8 HOURS PRN
Status: DISCONTINUED | OUTPATIENT
Start: 2024-06-05 | End: 2024-06-06 | Stop reason: HOSPADM

## 2024-06-05 RX ORDER — BISACODYL 5 MG
10 TABLET, DELAYED RELEASE (ENTERIC COATED) ORAL DAILY PRN
Status: DISCONTINUED | OUTPATIENT
Start: 2024-06-05 | End: 2024-06-06 | Stop reason: HOSPADM

## 2024-06-05 RX ORDER — ACETAMINOPHEN 325 MG/1
650 TABLET ORAL EVERY 6 HOURS PRN
Status: DISCONTINUED | OUTPATIENT
Start: 2024-06-05 | End: 2024-06-06 | Stop reason: HOSPADM

## 2024-06-05 RX ORDER — METOCLOPRAMIDE 10 MG/1
10 TABLET ORAL EVERY 6 HOURS PRN
Status: DISCONTINUED | OUTPATIENT
Start: 2024-06-05 | End: 2024-06-06 | Stop reason: HOSPADM

## 2024-06-05 RX ORDER — METOCLOPRAMIDE 10 MG/1
10 TABLET ORAL ONCE
Status: COMPLETED | OUTPATIENT
Start: 2024-06-05 | End: 2024-06-05

## 2024-06-05 RX ORDER — PROPOFOL 10 MG/ML
INJECTION, EMULSION INTRAVENOUS AS NEEDED
Status: DISCONTINUED | OUTPATIENT
Start: 2024-06-05 | End: 2024-06-05

## 2024-06-05 RX ORDER — SODIUM CHLORIDE, SODIUM LACTATE, POTASSIUM CHLORIDE, CALCIUM CHLORIDE 600; 310; 30; 20 MG/100ML; MG/100ML; MG/100ML; MG/100ML
50 INJECTION, SOLUTION INTRAVENOUS CONTINUOUS
Status: DISCONTINUED | OUTPATIENT
Start: 2024-06-05 | End: 2024-06-05

## 2024-06-05 RX ORDER — METOCLOPRAMIDE HYDROCHLORIDE 5 MG/ML
10 INJECTION INTRAMUSCULAR; INTRAVENOUS EVERY 6 HOURS PRN
Status: DISCONTINUED | OUTPATIENT
Start: 2024-06-05 | End: 2024-06-06 | Stop reason: HOSPADM

## 2024-06-05 RX ORDER — CEFAZOLIN SODIUM 2 G/100ML
2 INJECTION, SOLUTION INTRAVENOUS ONCE
Status: COMPLETED | OUTPATIENT
Start: 2024-06-05 | End: 2024-06-05

## 2024-06-05 RX ORDER — HYDROMORPHONE HYDROCHLORIDE 1 MG/ML
1 INJECTION, SOLUTION INTRAMUSCULAR; INTRAVENOUS; SUBCUTANEOUS EVERY 2 HOUR PRN
Status: DISCONTINUED | OUTPATIENT
Start: 2024-06-05 | End: 2024-06-06 | Stop reason: HOSPADM

## 2024-06-05 RX ORDER — PRAVASTATIN SODIUM 20 MG/1
20 TABLET ORAL NIGHTLY
Status: DISCONTINUED | OUTPATIENT
Start: 2024-06-05 | End: 2024-06-06 | Stop reason: HOSPADM

## 2024-06-05 RX ORDER — KETOROLAC TROMETHAMINE 15 MG/ML
15 INJECTION, SOLUTION INTRAMUSCULAR; INTRAVENOUS EVERY 6 HOURS
Status: COMPLETED | OUTPATIENT
Start: 2024-06-05 | End: 2024-06-06

## 2024-06-05 RX ORDER — SCOLOPAMINE TRANSDERMAL SYSTEM 1 MG/1
1 PATCH, EXTENDED RELEASE TRANSDERMAL ONCE
Status: DISCONTINUED | OUTPATIENT
Start: 2024-06-05 | End: 2024-06-06 | Stop reason: HOSPADM

## 2024-06-05 RX ORDER — SODIUM CHLORIDE, SODIUM LACTATE, POTASSIUM CHLORIDE, CALCIUM CHLORIDE 600; 310; 30; 20 MG/100ML; MG/100ML; MG/100ML; MG/100ML
100 INJECTION, SOLUTION INTRAVENOUS CONTINUOUS
Status: DISCONTINUED | OUTPATIENT
Start: 2024-06-05 | End: 2024-06-05 | Stop reason: HOSPADM

## 2024-06-05 RX ORDER — LEVOTHYROXINE SODIUM 112 UG/1
112 TABLET ORAL DAILY
Status: DISCONTINUED | OUTPATIENT
Start: 2024-06-06 | End: 2024-06-06 | Stop reason: HOSPADM

## 2024-06-05 RX ORDER — MIDAZOLAM HYDROCHLORIDE 1 MG/ML
1 INJECTION, SOLUTION INTRAMUSCULAR; INTRAVENOUS ONCE AS NEEDED
Status: DISCONTINUED | OUTPATIENT
Start: 2024-06-05 | End: 2024-06-05 | Stop reason: HOSPADM

## 2024-06-05 RX ORDER — PANTOPRAZOLE SODIUM 40 MG/1
40 TABLET, DELAYED RELEASE ORAL
Status: DISCONTINUED | OUTPATIENT
Start: 2024-06-06 | End: 2024-06-06 | Stop reason: HOSPADM

## 2024-06-05 RX ORDER — PREGABALIN 75 MG/1
75 CAPSULE ORAL ONCE
Status: COMPLETED | OUTPATIENT
Start: 2024-06-05 | End: 2024-06-05

## 2024-06-05 RX ORDER — OXYCODONE HCL 10 MG/1
10 TABLET, FILM COATED, EXTENDED RELEASE ORAL ONCE
Status: COMPLETED | OUTPATIENT
Start: 2024-06-05 | End: 2024-06-05

## 2024-06-05 RX ORDER — TRANEXAMIC ACID 100 MG/ML
INJECTION, SOLUTION INTRAVENOUS AS NEEDED
Status: DISCONTINUED | OUTPATIENT
Start: 2024-06-05 | End: 2024-06-05

## 2024-06-05 RX ORDER — FAMOTIDINE 10 MG/ML
20 INJECTION INTRAVENOUS ONCE
Status: DISCONTINUED | OUTPATIENT
Start: 2024-06-05 | End: 2024-06-05 | Stop reason: HOSPADM

## 2024-06-05 RX ORDER — ACETAMINOPHEN 325 MG/1
975 TABLET ORAL ONCE
Status: COMPLETED | OUTPATIENT
Start: 2024-06-05 | End: 2024-06-05

## 2024-06-05 RX ORDER — PHENYLEPHRINE HCL IN 0.9% NACL 1 MG/10 ML
SYRINGE (ML) INTRAVENOUS AS NEEDED
Status: DISCONTINUED | OUTPATIENT
Start: 2024-06-05 | End: 2024-06-05

## 2024-06-05 RX ORDER — ACETAMINOPHEN 500 MG
10 TABLET ORAL ONCE
Status: COMPLETED | OUTPATIENT
Start: 2024-06-05 | End: 2024-06-05

## 2024-06-05 RX ORDER — MEPERIDINE HYDROCHLORIDE 25 MG/ML
12.5 INJECTION INTRAMUSCULAR; INTRAVENOUS; SUBCUTANEOUS EVERY 10 MIN PRN
Status: DISCONTINUED | OUTPATIENT
Start: 2024-06-05 | End: 2024-06-05 | Stop reason: HOSPADM

## 2024-06-05 RX ORDER — MELOXICAM 7.5 MG/1
7.5 TABLET ORAL ONCE
Status: COMPLETED | OUTPATIENT
Start: 2024-06-05 | End: 2024-06-05

## 2024-06-05 RX ORDER — OXYCODONE HYDROCHLORIDE 5 MG/1
5 TABLET ORAL EVERY 4 HOURS PRN
Status: DISCONTINUED | OUTPATIENT
Start: 2024-06-05 | End: 2024-06-06 | Stop reason: HOSPADM

## 2024-06-05 RX ADMIN — SODIUM CHLORIDE, SODIUM LACTATE, POTASSIUM CHLORIDE, AND CALCIUM CHLORIDE: 600; 310; 30; 20 INJECTION, SOLUTION INTRAVENOUS at 09:52

## 2024-06-05 RX ADMIN — FENTANYL CITRATE 25 MCG: 50 INJECTION INTRAMUSCULAR; INTRAVENOUS at 08:55

## 2024-06-05 RX ADMIN — FENTANYL CITRATE 25 MCG: 50 INJECTION INTRAMUSCULAR; INTRAVENOUS at 09:08

## 2024-06-05 RX ADMIN — MELOXICAM 7.5 MG: 7.5 TABLET ORAL at 07:42

## 2024-06-05 RX ADMIN — OXYCODONE HYDROCHLORIDE 10 MG: 10 TABLET, FILM COATED, EXTENDED RELEASE ORAL at 07:41

## 2024-06-05 RX ADMIN — CEFAZOLIN SODIUM 2 G: 2 INJECTION, SOLUTION INTRAVENOUS at 08:42

## 2024-06-05 RX ADMIN — BUPIVACAINE HYDROCHLORIDE IN DEXTROSE 1.2 ML: 7.5 INJECTION, SOLUTION SUBARACHNOID at 08:43

## 2024-06-05 RX ADMIN — HYDRALAZINE HYDROCHLORIDE 10 MG: 20 INJECTION INTRAMUSCULAR; INTRAVENOUS at 09:13

## 2024-06-05 RX ADMIN — PROPOFOL 70 MCG/KG/MIN: 10 INJECTION, EMULSION INTRAVENOUS at 08:50

## 2024-06-05 RX ADMIN — SODIUM CHLORIDE TAB 1 GM 1 G: 1 TAB at 14:56

## 2024-06-05 RX ADMIN — SCOPALAMINE 1 PATCH: 1 PATCH, EXTENDED RELEASE TRANSDERMAL at 07:42

## 2024-06-05 RX ADMIN — LABETALOL HYDROCHLORIDE 5 MG: 5 INJECTION INTRAVENOUS at 09:11

## 2024-06-05 RX ADMIN — KETOROLAC TROMETHAMINE 15 MG: 15 INJECTION, SOLUTION INTRAMUSCULAR; INTRAVENOUS at 21:28

## 2024-06-05 RX ADMIN — Medication 10 MG: at 23:39

## 2024-06-05 RX ADMIN — LORAZEPAM 0.5 MG: 0.5 TABLET ORAL at 21:24

## 2024-06-05 RX ADMIN — SODIUM CHLORIDE, SODIUM LACTATE, POTASSIUM CHLORIDE, AND CALCIUM CHLORIDE 75 ML/HR: 600; 310; 30; 20 INJECTION, SOLUTION INTRAVENOUS at 07:56

## 2024-06-05 RX ADMIN — HYDRALAZINE HYDROCHLORIDE 10 MG: 20 INJECTION INTRAMUSCULAR; INTRAVENOUS at 09:24

## 2024-06-05 RX ADMIN — SODIUM CHLORIDE TAB 1 GM 1 G: 1 TAB at 21:23

## 2024-06-05 RX ADMIN — Medication 100 MCG: at 10:07

## 2024-06-05 RX ADMIN — KETOROLAC TROMETHAMINE 15 MG: 15 INJECTION, SOLUTION INTRAMUSCULAR; INTRAVENOUS at 16:20

## 2024-06-05 RX ADMIN — Medication 2 L/MIN: at 12:45

## 2024-06-05 RX ADMIN — TRANEXAMIC ACID 2000 MG: 1 INJECTION, SOLUTION INTRAVENOUS at 08:54

## 2024-06-05 RX ADMIN — FENTANYL CITRATE 25 MCG: 50 INJECTION INTRAMUSCULAR; INTRAVENOUS at 09:19

## 2024-06-05 RX ADMIN — OXYCODONE HYDROCHLORIDE 10 MG: 5 TABLET ORAL at 20:00

## 2024-06-05 RX ADMIN — FENTANYL CITRATE 25 MCG: 50 INJECTION INTRAMUSCULAR; INTRAVENOUS at 08:54

## 2024-06-05 RX ADMIN — METOCLOPRAMIDE 10 MG: 10 TABLET ORAL at 08:19

## 2024-06-05 RX ADMIN — PRAVASTATIN SODIUM 20 MG: 20 TABLET ORAL at 21:24

## 2024-06-05 RX ADMIN — SODIUM CHLORIDE 100 ML/HR: 900 INJECTION, SOLUTION INTRAVENOUS at 13:08

## 2024-06-05 RX ADMIN — ESCITALOPRAM OXALATE 20 MG: 10 TABLET ORAL at 21:24

## 2024-06-05 RX ADMIN — ACETAMINOPHEN 650 MG: 325 TABLET ORAL at 23:10

## 2024-06-05 RX ADMIN — CEFAZOLIN SODIUM 2 G: 2 INJECTION, SOLUTION INTRAVENOUS at 17:16

## 2024-06-05 RX ADMIN — POVIDONE-IODINE 1 APPLICATION: 5 SOLUTION TOPICAL at 07:46

## 2024-06-05 RX ADMIN — ASPIRIN 81 MG: 81 TABLET, COATED ORAL at 21:23

## 2024-06-05 RX ADMIN — ONDANSETRON 4 MG: 2 INJECTION, SOLUTION INTRAMUSCULAR; INTRAVENOUS at 10:20

## 2024-06-05 RX ADMIN — ACETAMINOPHEN 975 MG: 325 TABLET ORAL at 07:41

## 2024-06-05 RX ADMIN — DOCUSATE SODIUM 100 MG: 100 CAPSULE, LIQUID FILLED ORAL at 21:24

## 2024-06-05 RX ADMIN — PREGABALIN 75 MG: 75 CAPSULE ORAL at 07:42

## 2024-06-05 RX ADMIN — PROPOFOL 100 MCG/KG/MIN: 10 INJECTION, EMULSION INTRAVENOUS at 09:14

## 2024-06-05 RX ADMIN — DIPHENHYDRAMINE HYDROCHLORIDE 12.5 MG: 12.5 SOLUTION ORAL at 23:11

## 2024-06-05 RX ADMIN — LABETALOL HYDROCHLORIDE 5 MG: 5 INJECTION INTRAVENOUS at 09:14

## 2024-06-05 RX ADMIN — Medication 100 MCG: at 10:18

## 2024-06-05 RX ADMIN — MIDAZOLAM 2 MG: 1 INJECTION INTRAMUSCULAR; INTRAVENOUS at 08:28

## 2024-06-05 RX ADMIN — DEXAMETHASONE SODIUM PHOSPHATE 4 MG: 4 INJECTION, SOLUTION INTRAMUSCULAR; INTRAVENOUS at 09:10

## 2024-06-05 SDOH — ECONOMIC STABILITY: TRANSPORTATION INSECURITY
IN THE PAST 12 MONTHS, HAS LACK OF TRANSPORTATION KEPT YOU FROM MEETINGS, WORK, OR FROM GETTING THINGS NEEDED FOR DAILY LIVING?: NO

## 2024-06-05 SDOH — HEALTH STABILITY: MENTAL HEALTH: HOW MANY STANDARD DRINKS CONTAINING ALCOHOL DO YOU HAVE ON A TYPICAL DAY?: PATIENT DOES NOT DRINK

## 2024-06-05 SDOH — SOCIAL STABILITY: SOCIAL INSECURITY
WITHIN THE LAST YEAR, HAVE TO BEEN RAPED OR FORCED TO HAVE ANY KIND OF SEXUAL ACTIVITY BY YOUR PARTNER OR EX-PARTNER?: NO

## 2024-06-05 SDOH — ECONOMIC STABILITY: HOUSING INSECURITY: IN THE LAST 12 MONTHS, HOW MANY PLACES HAVE YOU LIVED?: 1

## 2024-06-05 SDOH — SOCIAL STABILITY: SOCIAL INSECURITY: ARE THERE ANY APPARENT SIGNS OF INJURIES/BEHAVIORS THAT COULD BE RELATED TO ABUSE/NEGLECT?: NO

## 2024-06-05 SDOH — HEALTH STABILITY: MENTAL HEALTH: HOW OFTEN DO YOU HAVE 6 OR MORE DRINKS ON ONE OCCASION?: PATIENT UNABLE TO ANSWER

## 2024-06-05 SDOH — SOCIAL STABILITY: SOCIAL INSECURITY: HAS ANYONE EVER THREATENED TO HURT YOUR FAMILY OR YOUR PETS?: NO

## 2024-06-05 SDOH — SOCIAL STABILITY: SOCIAL INSECURITY: ABUSE: ADULT

## 2024-06-05 SDOH — HEALTH STABILITY: MENTAL HEALTH
HOW OFTEN DO YOU NEED TO HAVE SOMEONE HELP YOU WHEN YOU READ INSTRUCTIONS, PAMPHLETS, OR OTHER WRITTEN MATERIAL FROM YOUR DOCTOR OR PHARMACY?: NEVER

## 2024-06-05 SDOH — ECONOMIC STABILITY: HOUSING INSECURITY
IN THE LAST 12 MONTHS, WAS THERE A TIME WHEN YOU DID NOT HAVE A STEADY PLACE TO SLEEP OR SLEPT IN A SHELTER (INCLUDING NOW)?: NO

## 2024-06-05 SDOH — ECONOMIC STABILITY: FOOD INSECURITY: WITHIN THE PAST 12 MONTHS, YOU WORRIED THAT YOUR FOOD WOULD RUN OUT BEFORE YOU GOT MONEY TO BUY MORE.: NEVER TRUE

## 2024-06-05 SDOH — ECONOMIC STABILITY: FOOD INSECURITY: WITHIN THE PAST 12 MONTHS, THE FOOD YOU BOUGHT JUST DIDN'T LAST AND YOU DIDN'T HAVE MONEY TO GET MORE.: NEVER TRUE

## 2024-06-05 SDOH — SOCIAL STABILITY: SOCIAL NETWORK: HOW OFTEN DO YOU GET TOGETHER WITH FRIENDS OR RELATIVES?: MORE THAN THREE TIMES A WEEK

## 2024-06-05 SDOH — SOCIAL STABILITY: SOCIAL INSECURITY: WITHIN THE LAST YEAR, HAVE YOU BEEN AFRAID OF YOUR PARTNER OR EX-PARTNER?: NO

## 2024-06-05 SDOH — SOCIAL STABILITY: SOCIAL NETWORK: ARE YOU MARRIED, WIDOWED, DIVORCED, SEPARATED, NEVER MARRIED, OR LIVING WITH A PARTNER?: MARRIED

## 2024-06-05 SDOH — ECONOMIC STABILITY: INCOME INSECURITY: IN THE LAST 12 MONTHS, WAS THERE A TIME WHEN YOU WERE NOT ABLE TO PAY THE MORTGAGE OR RENT ON TIME?: NO

## 2024-06-05 SDOH — SOCIAL STABILITY: SOCIAL NETWORK: HOW OFTEN DO YOU ATTEND CHURCH OR RELIGIOUS SERVICES?: NEVER

## 2024-06-05 SDOH — SOCIAL STABILITY: SOCIAL INSECURITY: WITHIN THE LAST YEAR, HAVE YOU BEEN HUMILIATED OR EMOTIONALLY ABUSED IN OTHER WAYS BY YOUR PARTNER OR EX-PARTNER?: NO

## 2024-06-05 SDOH — SOCIAL STABILITY: SOCIAL INSECURITY: WERE YOU ABLE TO COMPLETE ALL THE BEHAVIORAL HEALTH SCREENINGS?: YES

## 2024-06-05 SDOH — SOCIAL STABILITY: SOCIAL NETWORK
DO YOU BELONG TO ANY CLUBS OR ORGANIZATIONS SUCH AS CHURCH GROUPS UNIONS, FRATERNAL OR ATHLETIC GROUPS, OR SCHOOL GROUPS?: NO

## 2024-06-05 SDOH — ECONOMIC STABILITY: INCOME INSECURITY: IN THE PAST 12 MONTHS, HAS THE ELECTRIC, GAS, OIL, OR WATER COMPANY THREATENED TO SHUT OFF SERVICE IN YOUR HOME?: NO

## 2024-06-05 SDOH — SOCIAL STABILITY: SOCIAL INSECURITY
WITHIN THE LAST YEAR, HAVE YOU BEEN KICKED, HIT, SLAPPED, OR OTHERWISE PHYSICALLY HURT BY YOUR PARTNER OR EX-PARTNER?: NO

## 2024-06-05 SDOH — SOCIAL STABILITY: SOCIAL NETWORK
IN A TYPICAL WEEK, HOW MANY TIMES DO YOU TALK ON THE PHONE WITH FAMILY, FRIENDS, OR NEIGHBORS?: MORE THAN THREE TIMES A WEEK

## 2024-06-05 SDOH — ECONOMIC STABILITY: INCOME INSECURITY: HOW HARD IS IT FOR YOU TO PAY FOR THE VERY BASICS LIKE FOOD, HOUSING, MEDICAL CARE, AND HEATING?: NOT VERY HARD

## 2024-06-05 SDOH — HEALTH STABILITY: MENTAL HEALTH
STRESS IS WHEN SOMEONE FEELS TENSE, NERVOUS, ANXIOUS, OR CAN'T SLEEP AT NIGHT BECAUSE THEIR MIND IS TROUBLED. HOW STRESSED ARE YOU?: TO SOME EXTENT

## 2024-06-05 SDOH — HEALTH STABILITY: MENTAL HEALTH: CURRENT SMOKER: 0

## 2024-06-05 SDOH — SOCIAL STABILITY: SOCIAL INSECURITY: DO YOU FEEL ANYONE HAS EXPLOITED OR TAKEN ADVANTAGE OF YOU FINANCIALLY OR OF YOUR PERSONAL PROPERTY?: NO

## 2024-06-05 SDOH — SOCIAL STABILITY: SOCIAL INSECURITY: ARE YOU OR HAVE YOU BEEN THREATENED OR ABUSED PHYSICALLY, EMOTIONALLY, OR SEXUALLY BY ANYONE?: NO

## 2024-06-05 SDOH — SOCIAL STABILITY: SOCIAL INSECURITY: HAVE YOU HAD THOUGHTS OF HARMING ANYONE ELSE?: NO

## 2024-06-05 SDOH — HEALTH STABILITY: MENTAL HEALTH: HOW OFTEN DO YOU HAVE A DRINK CONTAINING ALCOHOL?: NEVER

## 2024-06-05 SDOH — ECONOMIC STABILITY: TRANSPORTATION INSECURITY
IN THE PAST 12 MONTHS, HAS THE LACK OF TRANSPORTATION KEPT YOU FROM MEDICAL APPOINTMENTS OR FROM GETTING MEDICATIONS?: NO

## 2024-06-05 SDOH — SOCIAL STABILITY: SOCIAL INSECURITY: DO YOU FEEL UNSAFE GOING BACK TO THE PLACE WHERE YOU ARE LIVING?: NO

## 2024-06-05 SDOH — SOCIAL STABILITY: SOCIAL INSECURITY: HAVE YOU HAD ANY THOUGHTS OF HARMING ANYONE ELSE?: NO

## 2024-06-05 SDOH — SOCIAL STABILITY: SOCIAL NETWORK: HOW OFTEN DO YOU ATTENT MEETINGS OF THE CLUB OR ORGANIZATION YOU BELONG TO?: NEVER

## 2024-06-05 SDOH — SOCIAL STABILITY: SOCIAL INSECURITY: DOES ANYONE TRY TO KEEP YOU FROM HAVING/CONTACTING OTHER FRIENDS OR DOING THINGS OUTSIDE YOUR HOME?: NO

## 2024-06-05 ASSESSMENT — PAIN DESCRIPTION - DESCRIPTORS
DESCRIPTORS: ACHING;SORE
DESCRIPTORS: ACHING;SORE

## 2024-06-05 ASSESSMENT — COGNITIVE AND FUNCTIONAL STATUS - GENERAL
STANDING UP FROM CHAIR USING ARMS: A LITTLE
MOBILITY SCORE: 22
MOBILITY SCORE: 24
HELP NEEDED FOR BATHING: A LITTLE
DAILY ACTIVITIY SCORE: 24
PATIENT BASELINE BEDBOUND: NO
WALKING IN HOSPITAL ROOM: A LITTLE
MOBILITY SCORE: 19
CLIMB 3 TO 5 STEPS WITH RAILING: A LITTLE
WALKING IN HOSPITAL ROOM: A LITTLE
MOVING TO AND FROM BED TO CHAIR: A LOT
DAILY ACTIVITIY SCORE: 21
CLIMB 3 TO 5 STEPS WITH RAILING: A LITTLE
DRESSING REGULAR LOWER BODY CLOTHING: A LITTLE
TOILETING: A LITTLE

## 2024-06-05 ASSESSMENT — PAIN SCALES - GENERAL
PAINLEVEL_OUTOF10: 0 - NO PAIN
PAIN_LEVEL: 2
PAINLEVEL_OUTOF10: 0 - NO PAIN
PAINLEVEL_OUTOF10: 8
PAINLEVEL_OUTOF10: 5 - MODERATE PAIN
PAINLEVEL_OUTOF10: 10 - WORST POSSIBLE PAIN
PAINLEVEL_OUTOF10: 0 - NO PAIN
PAINLEVEL_OUTOF10: 4
PAINLEVEL_OUTOF10: 3
PAINLEVEL_OUTOF10: 7

## 2024-06-05 ASSESSMENT — PATIENT HEALTH QUESTIONNAIRE - PHQ9
SUM OF ALL RESPONSES TO PHQ9 QUESTIONS 1 & 2: 0
1. LITTLE INTEREST OR PLEASURE IN DOING THINGS: NOT AT ALL
2. FEELING DOWN, DEPRESSED OR HOPELESS: NOT AT ALL

## 2024-06-05 ASSESSMENT — ACTIVITIES OF DAILY LIVING (ADL)
HEARING - RIGHT EAR: FUNCTIONAL
ADEQUATE_TO_COMPLETE_ADL: YES
ADEQUATE_TO_COMPLETE_ADL: YES
BATHING: INDEPENDENT
ASSISTIVE_DEVICE: WALKER
LACK_OF_TRANSPORTATION: NO
DRESSING YOURSELF: INDEPENDENT
HEARING - LEFT EAR: FUNCTIONAL
ADL_ASSISTANCE: INDEPENDENT
PATIENT'S MEMORY ADEQUATE TO SAFELY COMPLETE DAILY ACTIVITIES?: YES
PATIENT'S MEMORY ADEQUATE TO SAFELY COMPLETE DAILY ACTIVITIES?: YES
JUDGMENT_ADEQUATE_SAFELY_COMPLETE_DAILY_ACTIVITIES: YES
GROOMING: INDEPENDENT
WALKS IN HOME: INDEPENDENT
TOILETING: INDEPENDENT
JUDGMENT_ADEQUATE_SAFELY_COMPLETE_DAILY_ACTIVITIES: YES
FEEDING YOURSELF: INDEPENDENT

## 2024-06-05 ASSESSMENT — LIFESTYLE VARIABLES
HOW MANY STANDARD DRINKS CONTAINING ALCOHOL DO YOU HAVE ON A TYPICAL DAY: PATIENT DOES NOT DRINK
AUDIT-C TOTAL SCORE: 0
SKIP TO QUESTIONS 9-10: 1
SKIP TO QUESTIONS 9-10: 0
AUDIT-C TOTAL SCORE: -1
SUBSTANCE_ABUSE_PAST_12_MONTHS: NO
HOW OFTEN DO YOU HAVE 6 OR MORE DRINKS ON ONE OCCASION: NEVER
PRESCIPTION_ABUSE_PAST_12_MONTHS: NO
HOW OFTEN DO YOU HAVE A DRINK CONTAINING ALCOHOL: NEVER
AUDIT-C TOTAL SCORE: 0

## 2024-06-05 ASSESSMENT — COLUMBIA-SUICIDE SEVERITY RATING SCALE - C-SSRS
6. HAVE YOU EVER DONE ANYTHING, STARTED TO DO ANYTHING, OR PREPARED TO DO ANYTHING TO END YOUR LIFE?: NO
1. IN THE PAST MONTH, HAVE YOU WISHED YOU WERE DEAD OR WISHED YOU COULD GO TO SLEEP AND NOT WAKE UP?: NO
2. HAVE YOU ACTUALLY HAD ANY THOUGHTS OF KILLING YOURSELF?: NO

## 2024-06-05 ASSESSMENT — PAIN - FUNCTIONAL ASSESSMENT
PAIN_FUNCTIONAL_ASSESSMENT: 0-10

## 2024-06-05 ASSESSMENT — PAIN SCALES - WONG BAKER
WONGBAKER_NUMERICALRESPONSE: HURTS WHOLE LOT
WONGBAKER_NUMERICALRESPONSE: HURTS LITTLE MORE

## 2024-06-05 ASSESSMENT — PAIN DESCRIPTION - LOCATION
LOCATION: HIP
LOCATION: HIP

## 2024-06-05 ASSESSMENT — PAIN DESCRIPTION - ORIENTATION
ORIENTATION: RIGHT
ORIENTATION: RIGHT

## 2024-06-05 ASSESSMENT — PAIN SCALES - PAIN ASSESSMENT IN ADVANCED DEMENTIA (PAINAD): TOTALSCORE: MEDICATION (SEE MAR);COLD APPLIED

## 2024-06-05 NOTE — ANESTHESIA PROCEDURE NOTES
Spinal Block    Patient location during procedure: OR  Start time: 6/5/2024 8:33 AM  End time: 6/5/2024 8:44 AM  Reason for block: primary anesthetic  Staffing  Performed: attending   Authorized by: Naeem Dunbar MD    Performed by: MANAV Rodriguez    Preanesthetic Checklist  Completed: patient identified, IV checked, risks and benefits discussed, surgical consent, pre-op evaluation, timeout performed and sterile techniques followed  Block Timeout  RN/Licensed healthcare professional reads aloud to the Anesthesia provider and entire team: Patient identity, procedure with side and site, patient position, and as applicable the availability of implants/special equipment/special requirements.  Patient on coagulant treatment: no  Timeout performed at: 6/5/2024 8:32 AM  Spinal Block  Patient position: sitting  Prep: Betadine  Sterility prep: gloves and drape  Sedation level: moderate sedation  Patient monitoring: blood pressure  Vertebral space: L2-3  Injection technique: single-shot  Needle  Needle gauge: 25 G  Needle length: 3.5 in  Free flowing CSF: yes    Assessment  Sensory level: T6  Procedure assessment: patient sedated but conversant throughout procedure and patient tolerated procedure well with no immediate complications  Additional Notes  Marcaine 0.75% x 1.2cc. Lot [id number]  2026-05-31

## 2024-06-05 NOTE — PROGRESS NOTES
[facility]   06/05/24 3543   Financial Resource Strain   How hard is it for you to pay for the very basics like food, housing, medical care, and heating? Not very   Housing Stability   In the last 12 months, was there a time when you were not able to pay the mortgage or rent on time? N   In the last 12 months, how many places have you lived? 1   In the last 12 months, was there a time when you did not have a steady place to sleep or slept in a shelter (including now)? N   Transportation Needs   In the past 12 months, has lack of transportation kept you from medical appointments or from getting medications? no   In the past 12 months, has lack of transportation kept you from meetings, work, or from getting things needed for daily living? No   Food Insecurity   Within the past 12 months, you worried that your food would run out before you got the money to buy more. Never true   Within the past 12 months, the food you bought just didn't last and you didn't have money to get more. Never true   Stress   Do you feel stress - tense, restless, nervous, or anxious, or unable to sleep at night because your mind is troubled all the time - these days? To some exte  (Takes Lexapro daily and Ativan PRN for anxiety/depression)   Social Connections   In a typical week, how many times do you talk on the phone with family, friends, or neighbors? More than 3   How often do you get together with friends or relatives? More than 3   How often do you attend Mormon or Baptism services? Never   Do you belong to any clubs or organizations such as Mormon groups, unions, fraternal or athletic groups, or school groups? No   How often do you attend meetings of the clubs or organizations you belong to? Never   Are you , , , , never , or living with a partner?    Intimate Partner Violence   Within the last year, have you been afraid of your partner or ex-partner? No   Within the last year, have you been  humiliated or emotionally abused in other ways by your partner or ex-partner? No   Within the last year, have you been kicked, hit, slapped, or otherwise physically hurt by your partner or ex-partner? No   Within the last year, have you been raped or forced to have any kind of sexual activity by your partner or ex-partner? No   Alcohol Use   Q1: How often do you have a drink containing alcohol? Never   Q2: How many drinks containing alcohol do you have on a typical day when you are drinking? None   Q3: How often do you have six or more drinks on one occasion? Pt Unable   Utilities   In the past 12 months has the electric, gas, oil, or water company threatened to shut off services in your home? No   Health Literacy   How often do you need to have someone help you when you read instructions, pamphlets, or other written material from your doctor or pharmacy? Never

## 2024-06-05 NOTE — PROGRESS NOTES
Orthopaedic Surgery Progress Note    S:    Evaluated post-operatively Pain controlled on current regimen.  Denies any new onset numbness, tingling or weakness. Denies nausea, vomiting, chest pain, dyspnea, or calf tenderness. Denies any fever or chills.     O:  BP 93/58 Comment: Dr. Camejo at bedside, ok to turn IVF off, ok to send to floor  Pulse 63   Temp 36.1 °C (97 °F)   Resp 20   Wt 59.4 kg (130 lb 15.3 oz)   SpO2 95%   BMI 21.14 kg/m²     GEN - NAD, resting comfortably in hospital bed  HEENT - MMM, EOMI, NCAT   CV - RRR by peripheral palpation, limbs wwp  PULM - NWOB   ABD - Non-distended  NEURO - HAGEN spontaneously, CNs II - XII grossly intact  PSYCH - Appropriate mood and affect    MSK:  RLE:   -Post-operative dressing/splint in place without strikethrough bleeding.   -Motor intact in DF/PF/EHL/FHL, SILT in saph/sural/SPN/DPN/tibial distributions  -Foot wwp, brisk cap refill, 2+ DP pulse      A/P: 74F PMH HTN, HLD, GERD, hypothyroid, anxiety/depression S/P R CHIVO with Dr. Rodriguez on 6/5.     -Clear liquid diet. Advance diet as tolerated  -Bowel regimen of colace, senna, miralax, and dulcolax PRN  -Tylenol, oxy 5/10, dilaudid PRN, IV toradol 15mg q6hr x 4 doses for pain management  -Continue LR@100; HLIV with good PO intake, BMP POD1  -PT/OT consult; WB status: WBAT; Posterior hip precautions: do not cross leg over midline, do not bend at waist more than 90 degrees, do not rotate foot or leg inwards.  -Encourage IS  -Perioperative abx - ancef 24 hours  -F/u CBC in AM, No indication for transfusion; 1x PO TXA on floor  -DVT ppx: SCDs + TEDs, ASA 81 bid     -Continue home medications - amlodipine, escitalopram, synthroid, ativan, PPI, statin  -Glycemic: No issues    Dispo: Pending PT, pain control    Plan was discussed with Dr. Rodriguez.     Madan Avila MD, PGY-4  Orthopedic Surgery  [id number]  Epic Chat Preferred

## 2024-06-05 NOTE — ANESTHESIA POSTPROCEDURE EVALUATION
Patient: Nancy Nuñez    Procedure Summary       Date: 06/05/24 Room / Location: [facility] 04 / Virtual [facility]    Anesthesia Start: 0827 Anesthesia Stop: 1047    Procedure: Hip Replacement Total (Right: Hip) Diagnosis:       Unilateral primary osteoarthritis, right hip      (Unilateral primary osteoarthritis, right hip [M16.11])    Surgeons: Juan David Rodriguez MD Responsible Provider: Naeem Dunbar MD    Anesthesia Type: spinal ASA Status: 2            Anesthesia Type: spinal    Vitals Value Taken Time   BP 98/62 06/05/24 1047   Temp 36 06/05/24 1047   Pulse 60 06/05/24 1047   Resp 18 06/05/24 1047   SpO2 100 06/05/24 1047       Anesthesia Post Evaluation    Patient location during evaluation: PACU  Patient participation: complete - patient participated  Level of consciousness: awake and alert  Pain score: 2  Pain management: adequate  Airway patency: patent  Cardiovascular status: acceptable  Respiratory status: acceptable  Hydration status: acceptable  Postoperative Nausea and Vomiting: none        No notable events documented.

## 2024-06-05 NOTE — DISCHARGE SUMMARY
MD Candace Macias, ANDREAS, PABlaneC, ATC  Adult Reconstruction and Joint Replacement Surgery  Phone: [phone]     Fax:[phone]             Discharge Summary    Discharge Diagnosis  Right Total Hip Arthroplasty    Issues Requiring Follow-Up  Home care services to start within 48 hours. Outpatient PT to start 2 weeks  S/P total Joint for Knees only. Hips optional.    Test Results Pending At Discharge  Pending Labs       No current pending labs.          Hospital Course  Patient underwent Right Total Hip Arthroplasty on 6/5 without complications. The patient was then taken to the PACU in stable condition. Patient was then transferred to the or.  Pain was appropriately controlled. Diet was advanced as tolerated. Patient progressed adequately through their recovery during hospital stay including PT/ OT and were recommended for discharge. Patient was then discharged on  to home in stable condition. Patient had uneventful hospital course. Patient was instructed on the use of pain medications as needed for pain. The signs and symptoms of infection were discussed and the patient was given our number to call should they have any questions or concerns following discharge.    Based on my clinical judgment, the patient was provided with a 7-day prescription for opioid medication at 30 MED, indicated for treatment of acute pain in the setting of recent Total Joint Arthroplasty. OARRS report was run and has demonstrated an appropriate time course.  The patient has been provided with counseling pertaining to safe use of opioid medication.    Patient may use operative extremity WBAT with use of walker for assistance with ambulation .  Mepilex dressing to be removed POD # 7 by home care and incision left open to air  OAC for DVT prophylaxis started on POD #1 and to be taken for 30 days    Patient is to follow-up in 6 weeks at scheduled post-op visit.     Face-to Face after surgery progress  note  Pertinent Physical Exam At Time of Discharge  Review of Systems   Constitutional: Negative.  Negative for activity change, chills, fatigue and fever.   HENT: Negative.     Eyes: Negative.    Respiratory: Negative.  Negative for cough, chest tightness, shortness of breath and wheezing.    Cardiovascular: Negative.  Negative for palpitations.   Gastrointestinal:  Negative for abdominal pain, blood in stool, nausea and vomiting.   Endocrine: Negative.  Negative for cold intolerance and polyuria.   Genitourinary: Negative.  Negative for difficulty urinating, dysuria, frequency, hematuria and urgency.   Musculoskeletal:  Positive for gait problem and joint swelling. Negative for arthralgias and back pain.   Skin: Negative.  Negative for color change, pallor, rash and wound.   Allergic/Immunologic: Negative.  Negative for environmental allergies.   Neurological:  Negative for dizziness, weakness and light-headedness.   Hematological: Negative.    Psychiatric/Behavioral:  Negative for agitation, confusion and suicidal ideas. The patient is not nervous/anxious.    All other systems reviewed and are negative    Physical Exam  side: right hip  Vitals and nursing note reviewed. VSS, Afebrile  Constitutional:       Appearance: Normal appearance, awake and alert.  HENT:      Head: Normocephalic and atraumatic.       Pupils: Pupils are equal, round, and reactive to light.   Cardiovascular:      Rate and Rhythm: Normal rate and regular rhythm.   Pulmonary:      Effort: Pulmonary effort is normal.     Abdominal:         Palpations: Abdomen is soft.   Musculoskeletal:   Sensation intact bilaterally, sural/saph/sp/tibal n.  Motor intact flexion/extension/DF/PF/EHL/FHL bilaterally. Palpable symmetric DP/PT pulse bilaterally. Spinal wearing off.    Skin:      Bulky Dressing intact to the surgical extremity. No signs of gross bloody or purulent drainage.     General: Skin is warm and dry.      Capillary Refill: Capillary refill  takes less than 2 seconds.   Neurological:      General: No focal deficit present.      Mental Status: She is alert and oriented to person, place, and time. Mental status is at baseline.   Psychiatric:         Mood and Affect: Mood normal.        Home Medications  Scheduled medications  No current facility-administered medications for this encounter.    Current Outpatient Medications:     acetaminophen (Tylenol Extra Strength) 500 mg tablet, Take 2 tablets (1,000 mg) by mouth every 6 hours if needed for mild pain (1 - 3)., Disp: 240 tablet, Rfl: 0    alendronate (Fosamax) 70 mg tablet, Take 1 tablet (70 mg) by mouth every 7 days., Disp: , Rfl:     amLODIPine (Norvasc) 2.5 mg tablet, Take 1 tablet (2.5 mg) by mouth once daily., Disp: , Rfl:     aspirin 81 mg EC tablet, Take 1 tablet (81 mg) by mouth 2 times a day., Disp: 60 tablet, Rfl: 0    docusate sodium (Colace) 100 mg capsule, Take 1 capsule (100 mg) by mouth 2 times a day., Disp: 60 capsule, Rfl: 0    escitalopram (Lexapro) 10 mg tablet, Take 2 tablets (20 mg) by mouth once daily at bedtime., Disp: , Rfl:     levothyroxine (Synthroid, Levoxyl) 112 mcg tablet, Take 1 tablet (112 mcg) by mouth once daily. X 6 days, not on Sundays, Disp: , Rfl:     LORazepam (Ativan) 0.5 mg tablet, Take 1 tablet (0.5 mg) by mouth every 6 hours if needed for anxiety., Disp: , Rfl:     meloxicam (Mobic) 15 mg tablet, Take 1 tablet (15 mg) by mouth once daily., Disp: 30 tablet, Rfl: 0    oxyCODONE (Roxicodone) 5 mg immediate release tablet, Take 1 tablet (5 mg) by mouth every 6 hours if needed for severe pain (7 - 10) for up to 7 days., Disp: 28 tablet, Rfl: 0    pantoprazole (ProtoNix) 40 mg EC tablet, Take 1 tablet (40 mg) by mouth once daily. Do not crush, chew, or split. (Patient taking differently: Take 1 tablet (40 mg) by mouth once daily as needed. Do not crush, chew, or split.), Disp: 90 tablet, Rfl: 3    pantoprazole (ProtoNix) 40 mg EC tablet, Take 1 tablet (40 mg) by mouth  2 times a day for 14 days. Patient may have this medication at home but I am giving her additional medicine for the Helicobacter pylori regiment. (Patient not taking: Reported on 5/22/2024), Disp: 28 tablet, Rfl: 0    pantoprazole (ProtoNix) 40 mg EC tablet, Take 1 tablet (40 mg) by mouth once daily. Do not crush, chew, or split., Disp: 30 tablet, Rfl: 0    polyethylene glycol (Glycolax, Miralax) 17 gram packet, Take 17 g by mouth once daily as needed., Disp: , Rfl:     polyethylene glycol (Glycolax, Miralax) 17 gram packet, Take 17 g by mouth once daily. Mix 1 cap (17g) into 8 ounces of fluid., Disp: 30 packet, Rfl: 0    simvastatin (Zocor) 10 mg tablet, Take 1 tablet (10 mg) by mouth once daily at bedtime., Disp: , Rfl:     traMADol (Ultram) 50 mg tablet, Take 1 tablet (50 mg) by mouth every 6 hours if needed for severe pain (7 - 10) for up to 7 days., Disp: 28 tablet, Rfl: 0     PRN medications      Discharge medications     Your medication list         Notice    Cannot display discharge medications because the patient has not yet been admitted.         You have not been prescribed any medications.     Outpatient Follow-Up  Patient to follow-up with /Candace Mc PA-C.  Thank you for trusting us with your care. You should be scheduled for a follow-up post-surgical visit in 6 weeks.    Special Instructions  None    Please read discharge instructions provided by your surgeon before calling with questions as this will delay care.    Medication refills-Oxycodone and Tramadol will be refilled every 7 days per state law. Request refills through [facility] at the institution in which you had surgery or MyChart. All medication requests may take up to 72 hours to refill and refills after Friday 1pm will be refilled on the next business day.      No future appointments.    CHRISTI Merchant PA-C ATC  Orthopedic Physician Assisant  Adult Reconstruction and Total Joint Replacement  General  Orthopedics  Department of Orthopaedic Surgery  [place] [phone]  Albert messaging preferred

## 2024-06-05 NOTE — OP NOTE
Hip Replacement Total (R) Operative Note     Date: 2024  OR Location: [facility]    Name: Nancy Nuñez, : 1950, Age: 74 y.o., MRN: [id number], Sex: female    Diagnosis  Pre-op Diagnosis     * Unilateral primary osteoarthritis, right hip [M16.11] Post-op Diagnosis     * Unilateral primary osteoarthritis, right hip [M16.11]     Procedures  Hip Replacement Total  53148 - MA ARTHRP ACETBLR/PROX FEM PROSTC AGRFT/ALGRFT      Surgeons      * Juan David Rodriguez - Primary    Resident/Fellow/Other Assistant:  Surgeons and Role:  * No surgeons found with a matching role *    Procedure Summary  Anesthesia: Consult  ASA: II  Anesthesia Staff: Anesthesiologist: Naeem Dunbar MD  C-AA: MANAV Rodriguez  Estimated Blood Loss: 100mL  Intra-op Medications:   Administrations occurring from 0830 to 1055 on 24:   Medication Name Total Dose   ropivacaine-epinephrine-clonidine-ketorolac 2.46-0.005- 0.0008-0.3mg/mL periarticular syringe 50 mL   lactated Ringer's infusion 78.75 mL   ceFAZolin in dextrose (iso-os) (Ancef) IVPB 2 g 2 g              Anesthesia Record               Intraprocedure I/O Totals          Intake    Tranexamic Acid 0.00 mL    The total shown is the total volume documented since Anesthesia Start was filed.    Propofol Drip 0.00 mL    The total shown is the total volume documented since Anesthesia Start was filed.    lactated Ringer's infusion 1000.00 mL    Total Intake 1000 mL       Output    Est. Blood Loss 100 mL    Total Output 100 mL       Net    Net Volume 900 mL          Specimen: No specimens collected     Staff:   Circulator: Tiera Rayaub Person: Adrianne  Scrub Person: Salma         Drains and/or Catheters: * None in log *    Tourniquet Times:         Implants:  Implants       Type Name Action Serial No.      Screw SCREW CANCELLOUS 6.5 X 30 - [id number] Implanted      Screw SCREW CANCELLOUS 6.5 X 25 - [id number] Implanted      Joint Hip ACETABULAR CUP, SECTOR, GRIPTON, SIZE 52MM - [id number]  Implanted      Graft BONE, CHIP, CANCELLOUS, 1.7-10 MM, 15 CC - [id number] - [id number] Implanted [id number]     Graft BONE, CHIP, CANCELLOUS, 1.7-10 MM, 15 CC - [id number] - [id number] Implanted [id number]     Joint Hip LINER, ALTRX, NEURTAL, 32 X 52MM - [id number] Implanted      Joint Hip HIP STEM, SUMMIT POR 4 STD - [id number] Implanted      Joint Hip HEAD, FEMORAL, 12/14 TAPER, ARTICUL/LIZ, 32 MM, +5 MM NECK, COBALT CHROME - [id number] Implanted               Findings: advanced OA    Indications: Nancy Nuñez is an 74 y.o. female who is having surgery for Unilateral primary osteoarthritis, right hip [M16.11].     The patient was seen in the preoperative area. The risks, benefits, complications, treatment options, non-operative alternatives, expected recovery and outcomes were discussed with the patient. The possibilities of reaction to medication, pulmonary aspiration, injury to surrounding structures, bleeding, recurrent infection, the need for additional procedures, failure to diagnose a condition, and creating a complication requiring transfusion or operation were discussed with the patient. The patient concurred with the proposed plan, giving informed consent.  The site of surgery was properly noted/marked if necessary per policy. The patient has been actively warmed in preoperative area. Preoperative antibiotics have been ordered and given within 1 hours of incision. Venous thrombosis prophylaxis have been ordered including bilateral sequential compression devices    Procedure Details: R CHIVO  Complications:  None; patient tolerated the procedure well.    Disposition: PACU - hemodynamically stable.  Condition: stable     PREOPERATIVE DIAGNOSIS:  right hip osteoarthritis     POSTOPERATIVE DIAGNOSIS:  right hip osteoarthritis     OPERATION/PROCEDURE:  right total hip arthroplasty     SURGEON:  Juan David Rodriguez MD     ASSISTANT(S):  Madan Avila     ANESTHESIA:  Spinal     ESTIMATED BLOOD LOSS AND  INTRAVENOUS FLUIDS:  Please see Anesthesia record     LOCATION:  [facility] [place]     COMPONENTS USED:  1.  Pearson Gription acetabular sector shell 52  2.  [place] femoral stem tapered with Porocoat, size 4 STD  3.  Pearson AltrX polyethylene acetabular liner, neutral, 32/52  4.  M-spec metal femoral head +5     BRIEF CLINICAL NOTE:  The patient is a 75 yo female with severe radiographic  osteoarthritis of the right hip.  They failed conservative treatment  and wished to proceed with total hip arthroplasty, which is indicated  at this time.  We discussed the risks, benefits, alternatives of  surgery including, but not limited to, infection, damage to vessel or  nerve, bleeding, soft tissue pain, DVT, PE, problems with anesthesia,  leg length discrepancy, dislocation, continued soft tissue pain, lack  of range of motion, need for further surgery, etc.  Consent was  obtained.  They were taken to the operating room in order to undergo  the procedure.     OPERATIVE REPORT:  The patient was transferred to the operating room table.  Time-out  was performed confirming patient name, medical record number,  surgical site, and adequate and appropriate imaging.  The patient  received appropriate IV antibiotics as well as tranexamic acid prior  to the start of the procedure.  Once we prepped and draped,  posterolateral approach to the hip was performed.  The skin and  subcutaneous tissues were incised sharply.  Hemostasis was obtained  using electrocautery.  The underlying gluteal fascia was identified  and entered using electrocautery followed by Robison scissors.  Charnley  bow was placed.  The short external rotators and capsule were taken  down in one piece and tagged for later reapproximation making sure to protect the sciatic nerve.  The hip was dislocated.  Provisional femoral neck cut was performed.  The femoral head was removed.  Thye had extensive degenerative changes bipolarly.  The acetabulum was exposed. We reamed medially and  anterioly. She is fairly displastic.  We reamed until we had interference fit. I did backfill with 30cc of cqancelous croutons in a reverse ream technique. We then placed a 52mm Gription shell in appropriate anteversion and inclination.  Two screws were placed to the cup in the pelvis.  Neutral trial liner was placed.  We turned our attention back to the femur.  The femur was sequentially reamed and broached until we had proximal fit and fill. We then trialed with multiple neck lengths and offsets.  They were stable in position of sleep, flexion, internalrotation, did not impinge in external rotation.  I was happy with theposition of the components and the stability of the hip.  All trial components were removed.  The wound was thoroughly irrigated.  The real polyethylene liner was  placed.  The stem was seated to the level of broach and the head was joined with the trunion. The hip was relocated.  The short external rotators and capsule were reapproximated to the trochanter through drill holes  using #5 Ethibond.  The fascia was closed with interrupted 0 Vicryl.  The subcu was closed with interrupted 2-0 Vicryl, and the skin was closed running 3-0 Biosyn followed by Dermabond and Steri-Strips.  Dry  sterile dressing was placed.  The patient was transferred back to the hospital bed without evidence of complication.  They will be weightbearing as tolerated.  They will be on ASA and SCDs for DVT  prophylaxis.     Additional Details: WBAT, ASA    Attending Attestation: I was present and scrubbed for the key portions of the procedure.

## 2024-06-05 NOTE — CARE PLAN
The patient's goals for the shift include no pain    The clinical goals for the shift include no pain

## 2024-06-05 NOTE — CONSULTS
Inpatient consult to Medicine  Consult performed by: Zina Ray PA-C  Consult ordered by: Juan David Rodriguez MD  Reason for consult: Current hypotension and hyponatremia with a h/o anxiety, depression, HTN, acquired hypothyroidism due to Hashimoto's thyroiditis.          History and Physical    Nancy Nuñez is a 74 y.o. female on day 0 of admission presenting with Unilateral primary osteoarthritis, right hip.  Room: 202    Subjective   74 year old female with pmhx anxiety, depression, HTN, acquired hypothyroidism due to Hashimoto's thyroiditis. Her blood pressures are very soft with SBP in the 90s and diastolics in the 50s.     Per nurse, post operatively, patient was given 2.5L fluids and 100mcg phenylephrine x2 doses.    She did take Ativan prior to surgery. She's groggy but responds to questions appropriately.    ROS: Dysuria that gyn is evaluating. She admits to not drinking much water.         6/5/2024    11:25 AM 6/5/2024    11:35 AM 6/5/2024    11:50 AM 6/5/2024    11:55 AM 6/5/2024    12:00 PM 6/5/2024    12:15 PM 6/5/2024    12:27 PM   Vitals   Systolic 93 88 92 95 93 94 94   Diastolic 57 53 56 56 58 58 50   Heart Rate 63 61 62 61 63 63 63   Temp     36.1 °C (97 °F)  36 °C (96.8 °F)   Resp       16       PMHx:  Past Medical History:   Diagnosis Date    Anxiety and depression     Inpatient stays x 2 in 2010    Cataract     Bilaterally    H. pylori infection     1/2024 per EGD    HLD (hyperlipidemia)     HTN (hypertension)     Hyponatremia     Chronic Low normal - low    Hypothyroidism     due to Hashimoto's thyroiditis clinically and biochemically euthyroid on replacement    Osteoarthritis     Osteoporosis     Posterior vitreous detachment of right eye       Past Surgical Hx:  Past Surgical History:  No date: COLONOSCOPY      Comment:  Internal hemorrhoids, diverticulosis, polyps  No date: ESOPHAGOSCOPY / EGD  09/22/2006: HYSTEROSCOPY  No date: RHINOPLASTY  No date: TONSILLECTOMY  06/05/2024: TOTAL  HIP ARTHROPLASTY; Bilateral      Comment:  Left 4/20/2022, Right 6/5/2024    Social history:   reports that she has quit smoking. Her smoking use included cigarettes. She has been exposed to tobacco smoke. She has never used smokeless tobacco.    Family history:   No family history on file.    Allergies   Allergen Reactions    Cat Dander Runny nose    Dog Dander Runny nose    House Dust Runny nose    Mold Runny nose     Home Medication:   alendronate (Fosamax) 70 mg tablet Take 1 tablet (70 mg) by mouth every 7 days.   amLODIPine (Norvasc) 2.5 mg tablet Take 1 tablet (2.5 mg) by mouth once daily.   escitalopram (Lexapro) 10 mg tablet Take 2 tablets (20 mg) by mouth once daily at bedtime.   levothyroxine (Synthroid, Levoxyl) 112 mcg tablet Take 1 tablet (112 mcg) by mouth once daily. X 6 days, not on Sundays   LORazepam (Ativan) 0.5 mg tablet Take 1 tablet (0.5 mg) by mouth every 6 hours if needed for anxiety.   pantoprazole (ProtoNix) 40 mg EC tablet Take 1 tablet (40 mg) by mouth once daily. Do not crush, chew, or split.   polyethylene glycol (Glycolax, Miralax) 17 gram packet Take 17 g by mouth once daily as needed.   simvastatin (Zocor) 10 mg tablet Take 1 tablet (10 mg) by mouth once daily at bedtime.     Last Recorded Vitals  Blood pressure 94/50, pulse 63, temperature 36 °C (96.8 °F), temperature source Temporal, resp. rate 16, weight 59.4 kg (130 lb 15.3 oz), SpO2 98%.    Physical Exam  General: Patient in no acute distress, lying in bed, groggy  HEENT: EOMI, dry mucous membranes, anicteric  Neck: No JVD, no cervical lymphadenopathy  Heart: RRR, I/VI murmur  Lungs: Clear to auscultation bilaterally, no wheezing, rhonchi, or rales  Abdomen: Soft, nontender, nondistended, no organomegaly  Extremities: No peripheral edema,   Skin: No rash or cyanosis  Neurological: AOx3, non focal  Psychiatric: Cooperative and pleasant    Recent Results:   Latest Reference Range & Units 05/22/24 12:24   GLUCOSE 74 - 99 mg/dL 84    SODIUM 136 - 145 mmol/L 131 (L)   POTASSIUM 3.5 - 5.3 mmol/L 4.1   CHLORIDE 98 - 107 mmol/L 98   Bicarbonate 21 - 32 mmol/L 25   Anion Gap 10 - 20 mmol/L 12   Blood Urea Nitrogen 6 - 23 mg/dL 13   Creatinine 0.50 - 1.05 mg/dL 0.92   EGFR >60 mL/min/1.73m*2 66   Calcium 8.6 - 10.3 mg/dL 9.8   Albumin 3.4 - 5.0 g/dL 4.2   Alkaline Phosphatase 33 - 136 U/L 56   ALT 7 - 45 U/L 6 (L)   AST 9 - 39 U/L 10   Bilirubin Total 0.0 - 1.2 mg/dL 0.4   Total Protein 6.4 - 8.2 g/dL 7.9   Hemoglobin A1C See below % 5.4   Estimated Average Glucose Not Established mg/dL 108   LEUKOCYTES (10*3/UL) IN BLOOD BY AUTOMATED COUNT, Serbian 4.4 - 11.3 x10*3/uL 7.5   nRBC  COMMENT ONLY   ERYTHROCYTES (10*6/UL) IN BLOOD BY AUTOMATED COUNT, Serbian 4.00 - 5.20 x10*6/uL 4.47   HEMOGLOBIN 12.0 - 16.0 g/dL 13.4   HEMATOCRIT 36.0 - 46.0 % 41.9   MCV 80 - 100 fL 94   MCH 26.0 - 34.0 pg 30.0   MCHC 32.0 - 36.0 g/dL 32.0   RED CELL DISTRIBUTION WIDTH 11.5 - 14.5 % 13.7   PLATELETS (10*3/UL) IN BLOOD AUTOMATED COUNT, Serbian 150 - 450 x10*3/uL 399     Assessment/Plan   1) status post right total hip replacement   Ortho management   PT eval/treat  Incentive spirometry  BM management  Supportive Care  Antibiotic prophylaxis per ortho  DVT prophylaxis per ortho  2) DVT prophylaxis   Per Ortho management  81 mg ASA BID  SCDs  Ambulate as tolerated  3) history of hypertension   Patient is typically on 2.5 mg amlodipine however currently she is hypotensive.  We will hold her amlodipine until BPs increases.  4) history of chronic low to low normal hyponatremia   Preoperative sodium 131.  Stat BMP ordered to reassess given she had 2.5 L of fluid.  She will likely need more fluids due to her low BP.  We will continue to monitor.  May need salt tablets.  5) anxiety and depression   Patient took Ativan prior to arrival.  This may account for her low BP.  We will continue her Lexapro.  6) acquired hypothyroidism due to Hashimoto's thyroiditis  Continue  levothyroxine         I spent 45 minutes in the professional and overall care of this patient.      Zina Ray PA-C

## 2024-06-05 NOTE — ANESTHESIA PREPROCEDURE EVALUATION
Patient: Nancy Nuñez    Procedure Information       Date/Time: 06/05/24 0830    Procedure: Hip Replacement Total Uncement Unilat DePuy Implants *Overnight (Right: Hip)    Location: [facility] 04 / [facility]    Surgeons: Juan David Rodriguez MD            Relevant Problems   Musculoskeletal   (+) Primary osteoarthritis of right hip   (+) Unilateral primary osteoarthritis, right hip       Clinical information reviewed:   Tobacco   Meds   Med Hx  Surg Hx  OB Status  Fam Hx  Soc Hx        NPO Detail:  NPO/Void Status  Date of Last Liquid: 06/04/24  Time of Last Liquid: 2130  Date of Last Solid: 06/04/24  Time of Last Solid: 2000  Last Intake Type: Clear fluids; Solid meal  Time of Last Void: 0600         Physical Exam    Airway  Mallampati: II  TM distance: >3 FB  Neck ROM: full     Cardiovascular - normal exam     Dental - normal exam     Pulmonary - normal exam     Abdominal - normal exam             Anesthesia Plan    History of general anesthesia?: yes  History of complications of general anesthesia?: no    ASA 2     general and spinal     The patient is not a current smoker.  Patient was not previously instructed to abstain from smoking on day of procedure.  Patient did not smoke on day of procedure.  Education provided regarding risk of obstructive sleep apnea.  intravenous induction   Postoperative administration of opioids is intended.  Trial extubation is planned.  Anesthetic plan and risks discussed with patient.  Use of blood products discussed with patient who consented to blood products.    Plan discussed with CAA, attending and CRNA.

## 2024-06-05 NOTE — PERIOPERATIVE NURSING NOTE
Pt arrived in PACU via bed, Jeremy from anesthesia at bedside, s/p R hip arthroplasty, dressing CDI, ice applied, LR's infusing from OR

## 2024-06-05 NOTE — PROGRESS NOTES
TCC met with patient at the bedside to discuss care/discharge plan.  74 yr old female admitted extended recovery following right hip replacement with Dr. Rodriguez.  Plan is home tomorrow with [facility] [place] PT/OT. Confirmed SOC for 6/7/27.  Post op follow up confirmed on July 18, 2024 at 2:20 pm-[facility]  Spouse will transport home and assist with care as needed.    06/05/24 1705   Discharge Planning   Living Arrangements Spouse/significant other   Support Systems Spouse/significant other   Assistance Needed Bathing, mobility, transfers   Do you have animals or pets at home? No   Who is requesting discharge planning? Provider   Home or Post Acute Services In home services   Type of Home Care Services Home PT;Home OT   Patient expects to be discharged to: home   Does the patient need discharge transport arranged? No   Financial Resource Strain   How hard is it for you to pay for the very basics like food, housing, medical care, and heating? Not hard   Housing Stability   In the last 12 months, was there a time when you were not able to pay the mortgage or rent on time? N   In the last 12 months, how many places have you lived? 1   In the last 12 months, was there a time when you did not have a steady place to sleep or slept in a shelter (including now)? N   Transportation Needs   In the past 12 months, has lack of transportation kept you from medical appointments or from getting medications? no   In the past 12 months, has lack of transportation kept you from meetings, work, or from getting things needed for daily living? No   Patient Choice   Provider Choice list and CMS website (https://medicare.gov/care-compare#search) for post-acute Quality and Resource Measure Data were provided and reviewed with: Patient   Patient / Family choosing to utilize agency / facility established prior to hospitalization Yes

## 2024-06-05 NOTE — DISCHARGE INSTRUCTIONS
MD Candace Macias MPAS, SHRUTHI, ATC  Adult Reconstruction and Joint Replacement Surgery  Phone: [phone]     Fax: [phone]        DISCHARGE INSTRUCTIONS      PLEASE READ CAREFULLY BEFORE CONTACTING YOUR PROVIDER.    WE WORK COLLABORATIVELY AS A TEAM. CALLING MULTIPLE STAFF MEMBERS REGARDING THE SAME ISSUE WILL DELAY YOUR CARE.    [employer] IS THE PREFERRED COMMUNICATION FOR ALL TEAM MEMBERS.    POSTOPERATIVE INSTRUCTIONS: TOTAL HIP & TOTAL KNEE ARTHROPLASTY    JOINT CARE TEAM  Please use the information below to contact your care team following surgery.  If you are leaving a message or using the [employer] Chart portal, please include your full name, date of birth and date of surgery so that we can correctly identify you.  Your call will be returned within 1-2 business days, please do not leave multiple messages with other staff regarding a single issue while you are awaiting a return call.     Who to call Contact Information Matters needing handled   Candace Mc PA-C  Physician Assistant [employer] Portal   Medical questions/concerns       Merritt Garcia-    [email or web address]           [phone]  opt. 2    [phone] fax  [phone]         Scheduling office Visits  Medical questions/concerns  Leave of Absence or other paperwork  Any concerns more than 6 weeks from surgery - an appointment will need to be made   Nellie Lewis MBA, BSN, RN-BC  Ortho Nurse Navigator [place]    Danyell Decker RN, BSN  Ortho Nurse Navigator [place]        __________________________________    Kvng Bo RN, BSN  Ortho Coordinator [facility] NGN-BC  Ortho Nurse Navigator [facility]       [phone] Please call staff at the institution in which you had surgery for prescription refills        Prescription Refills  Nursing, medical question related questions or concerns within 6 weeks of surgery   Orders  for Outpatient Physical Therapy             MEDICATION REFILLS - MyChart or Nurse Navigator (Above) at the institution in which you had surgery. [facility] or [facility].    -You will NOT receive a call indicating that your prescription has been filled.  Please contact your pharmacy with any questions.    Medication refills will be filled Monday-Friday 7am to 1pm ONLY. Please call the nurse navigator office or send a [employer] message for a refill request.  Any requests received outside of this timeframe will be handled on the next business day.  Please do not call multiple times or call other members of the care team for medication needs, this will cause the refill to take longer.    Per State and Institutional policy, pain medications can only be refilled every 7 days for up to six weeks following surgery.    My Chart Portal: If you are using the My Chart portal and are requesting a medication refill, please list what type of surgery you had and left or right side, medication that needs refilled, and pharmacy you would like your medication sent.     WEIGHT BEARING- weight bearing as tolerated to operative extremity     ACTIVITY-As Tolerated    DRIVING & TRAVEL AFTER SURGERY   Patients should anticipate waiting at least 4-6 weeks before traveling long distances after surgery.  You will need to stop to walk around ever 1 hour during your travel to help with blood clot prevention.    Patients may not drive until cleared by the joint nurse or the office and you are off of all narcotics.    DENTAL PROCEDURES & CLEANINGS  You must wait a minimum of 3 months for elective dental appointments after a total joint replacement, including routine cleanings or dental work including bridges, crowns, extractions, etc.. Unless, it is an emergency. You will need a prophylactic antibiotic lifelong prior to any dental visit, cleaning or procedure. Your surgeon's office or your dentist may provide a prescription antibiotic. Antibiotics are  a lifelong need before dental appointments.      You do not need antibiotics for endoscopic procedures such as colonoscopy or EGD, dermatologic biopsies or eye surgeries.    WOUND CARE  If you experience continued drainage or bleeding, you may cover with abdominal/Maxi pads (purchase at local drug store).  Knee replacements should wrap with an ace wrap.  You may shower with waterproof dressing on. Your surgical bandage will be removed by your home therapist 1 week after surgery. If you have staples intact, home care will remove in 2 weeks. If you have sutures intact, you will need to return to the office in 2 weeks for suture removal. Once the dressing is removed by home care, you may continue to shower. Let soap and water wash over the wound. DO NOT SCRUB.  Steri-strips under the bandage will remain in place until they fall off on their own.  If they are loose, you may gently remove.  If they have not fallen off in two weeks, gently peel them off. Do not remove if pulling causes resistance against the incision.  You will see suture tails sticking out of the ends of the incision.  DO NOT CUT THEM.  They will fall off when the sutures dissolve.  If they are bothersome, cover with a band aid.  Do not soak in a bath tub, hot tub, pool or lake until you are 8 weeks out from surgery.  Do not apply lotions, creams or ointments until you are 6 weeks out from surgery,    PAIN, SWELLING, BRUISING & CLICKING  Pain and swelling are a natural part of your recovery which is considered normal for up to a year after surgery.  Symptoms may be treated with movement, ice, compression stockings, elevating your leg, and by following the pain medication regimen as prescribed.  Bruising is normal for several weeks after surgery. You may also have leg swelling and pain in your shin.  You may ice areas that are tender to help with discomfort.  You are required to wear the provided compression stockings, every day, for 4 weeks following  surgery.  Remove the stockings at night and place them back on in the morning.  Pain and swelling may temporarily increase with an increase in activity or exercise.  Use ice after activity.  Audible clicking with movement or exercises is considered normal following joint replacement.  If this persists at 6 months or 1 year, please notify your surgeon.  You may also feel decreased sensation or numbness near the incision site.  This is normal and sensation may or may not return.    PERSONAL HYGIENE  You may shower upon discharging from the hospital.  Soap and water is permitted to run over the surgical dressing, steri-strips and incision.  Do not scrub directly over these items.  DO NOT soak your incision in a bath, hot tub, pool or pond/lake for a minimum of 8 weeks following your surgery.  DO NOT use lotions, creams, ointments on your wound for a minimum of 6 weeks following your surgery. At that time you may use vitamin E to assist with softening of your incision.      RESTARTING HOME ROUTINE - DIET & MEDICATIONS  Post-operative constipation can result due to a combination of inactivity, anesthesia and pain medication. To help prevent this, you should increase your water and fiber intake. Physical activity such as walking will also help stimulate the bowels.   You may resume your normal diet when you discharge home.    To avoid constipation, choose foods that help promote good bowel habits, such as foods high in fiber.  You may restart your home medications the following day after your surgery UNLESS you have been given alternate instructions.  Follow the instructions given to you on your hospital discharge instructions for more information regarding your home medications.  IF YOU EXPERIENCE NAUSEA OR DIARRHEA, FOLLOW THE B.R.A.T. DIET UNTIL SYMPTOMS RESOLVE.  If you are experiencing vomiting that lasts more than 24 hours, please contact your joint nurse.      IN-HOME PHYSICAL THERAPY & OUTPATIENT PHYSICAL  THERAPY  In-home physical therapy will start 1-2 days after you get home from the hospital.    The home care agency will call within the first 24-48 hours to set up their first visit.  Please do not call your care team to inquire during this timeframe.  Continue the exercises you were given in the hospital until you have been seen by in-home therapy.  Make sure to provide a phone number with the ability for the home care staff to leave a message if you do not answer your phone.    Outpatient physical therapy following knee replacement surgery should begin 2-3 weeks after surgery.  You will be given physical therapy order prior to discharge from the hospital. You should call to schedule this appointment ASAP if not already scheduled before surgery.  Waiting until you are ready for outpatient physical therapy will cause a delay in your care.  You may choose any outpatient physical therapy location.      EMERGENCIES - WHEN TO CONTACT THE SURGEON'S OFFICE IMMEDIATELY  Fever >101 with chills that has been present for at least 48 hours.   Excessive bleeding from incision that will not slow down. A small amount of drainage is normal and expected.  Once pressure is applied and the area is covered, do not continue to check the area regularly.  This will remove pressure and bleeding will continue.  Leave in place for 4-6 hours.  Signs of infection of incision-excessive drainage that is soaking through your dressing (especially if it is pus-like), redness that is spreading out from the edges of your incision, or increased warmth around the area.  Excruciating pain for which the pain medication, taken as instructed, is not helping.  Severe calf pain.  Go directly to the emergency room or call 911, if you are experiencing chest pain or difficulty breathing.    After Hour and Weekend Emergency Answering Service [phone]    ICE & COLD THERAPY INSTRUCTIONS    To assist with pain control and post-op swelling, you should be using  ice regularly throughout recovery, especially for the first 6 weeks, regardless of the cold therapy method you use.      Always make sure there is a layer of protection between the cold pad and your skin.    If you are using ICE PACKS or GEL PACKS, you will need to alternate 20 minutes on, 20 minutes off twice per hour.    If you are using an ICE MACHINE, please follow the provided ice machine instructions.  These devices differ from ice or ice packs whereas the mechanism circulates water through tubing and a pad to provide longer periods of cold therapy to the desired site.  You can use your cold devices around the clock for optimal comfort.  We recommend using cold therapy after working with therapy or completing exercises on your own.  There is no set schedule in which you must follow while using cold therapy.  Below are a few points to remember when using a cold therapy device:    You do not need to need to use the 20 on, 20 off method.  Detach the pad from the cooler and ambulate at least once every hour.  You can check your skin under the pad at this time.  You may wear the cold therapy device during periods of sleep including overnight.  If you wake up during the night, you can check the skin at this time.  You do not need to wake up specifically to perform skin checks.  Empty the cooler and pad when device is not in use.  Follow 's instructions for cleaning your cold therapy device.    DISCHARGE MEDICATIONS - Please reference the sample schedule on the reverse side for instructions on how to best schedule medications.    PAIN MEDICATION    ___X_ Tramadol / Oxycodone  Tramadol and Oxycodone have been prescribed for post-operative pain control.    These medications will only be refilled ONCE every 7 days for a period of up to 6 weeks following surgery.  After 6 weeks, you will transition to acetaminophen and over -the- counter anti-inflammatories such as Ibuprofen, Advil or Aleve in conjunction  with ICE/COLD THERAPY.   Side effects may be constipation and nausea, vomiting, sleepiness, dizziness, lightheadedness, headache, blurred vision, dry mouth sweating, itching (if you have itching, over-the -counter Benadryl can be used as needed).  You may NOT operate a motor vehicle while taking these medications or have been cleared by your care team.     ___X_ Acetaminophen (Tylenol)  Acetaminophen has been prescribed as an adjunct for pain control. Take two 500 mg tablets every 6 hours for 4 weeks. You will not receive a refill on this medication.  Do not exceed 4000mg of acetaminophen within a 24 hour period.  Side effects may include nausea, heartburn, drowsiness, and headache.    ___X_ Meloxicam (Mobic)-Meloxicam has been prescribed as an adjunct anti-inflammatory to assist in pain control.    Take one 15mg tablet once daily for 4 weeks.  You will not receive refills on this medication.   Side effects may include nausea.  May not be prescribed if you are on a more potent blood thinner than aspirin or have chronic kidney disease.    BLOOD THINNER    ___X_ Blood Thinner   A blood thinner has been prescribed to prevent blood clots in your leg or lungs. Take as prescribed on the bottle for 4 weeks. You will not receive a refill on this medication.    ANTI NAUSEA    ___X_ Proton Pump Inhibitor (PPI)-Stomach Acid Reduction Medication  If you are already on a PPI, you will continue your regular medication. If you are not, you will be prescribed Pantoprazole to help with nausea and protect your stomach while taking pain medication.  You will not receive a refill on this medication.    STOOL SOFTENERS    ___X_ Colace (Docusate Sodium) & Miralax (polyethylene glycol)  Take both medications to help with constipation while using the Oxycodone and Tramadol for pain control.  You will not receive a refill on this medication.    Continued Constipation  If you continue to be constipated despite daily use of Miralax and  Colace, you try an over-the-counter Dulcolax Suppository and use per instructions on the package.       SPECIAL INSTRUCTIONS   None    You will not receive refills on the following medications.   Acetaminophen (Tylenol  Meloxicam  Miralax  Colace  Proton Pump Inhibitor (PPI)  Blood Thinner    Pain Medication Refills - Ortho Nurse Navigator or MyChart- Monday through Friday 7am-1pm    FOLLOW-UP- You should have an appointment with either Dr. Rodriguez or LOWELL Razo in 6 weeks.         SAMPLE              The times below are an example of how to organize medications to optimize pain control  Your actual medication schedule may vary based on your last dose taken IN THE HOSPITAL      Time 3:00 am 6:00 am 9:00 am 12:00 pm 3:00 pm 6:00 pm 9:00 pm 12:00 am   Medications Tramadol Tylenol  Oxycodone  Miralax   Blood Thinner  Colace  Pantoprazole or other PPI  Tramadol  Meloxicam Tylenol  Oxycodone Tramadol Tylenol  Oxycodone  Miralax Blood Thinner  Colace  Tramadol   Tylenol  Oxycodone            You may begin to wean off the pain medication as your pain remains controlled with increased activity.  The schedules provided are meant to serve as an example.  You may wean off based on your pain control.  Please note that pain medications are not filled beyond 6 weeks after surgery.              The times below are an example of how to WEAN OFF medications WHILE CONTINUING TO OPTIMIZE PAIN CONTROL.  Your actual medication schedule may vary based on your last dose taken.    Time 12:00am 4:00am 8:00am 12:00pm 4:00pm 8:00pm   Med Tramadol Oxycodone   Tramadol Oxycodone Tramadol Oxycodone     Time 12:00am 6:00am 12:00pm 6:00pm   Med Tramadol Oxycodone   Tramadol Oxycodone     Time 12:00am 8:00am 4:00pm   Med Tramadol Oxycodone   Tramadol     Time 12:00am 12:00pm   Med Tramadol Tramadol         TOTAL KNEE REPLACEMENT PATIENTS SHOULD TRANSITION TO OUTPATIENT PHYSICAL THERAPY NO MORE THAN 3 WEEKS FOLLOWING SURGERY.  PLEASE SEE  THE LIST OF  FACILITIES BELOW.  CALL TO SCHEDULE YOUR FIRST APPOINTMENT BEFORE YOU HAVE YOUR SURGERY.

## 2024-06-05 NOTE — PROGRESS NOTES
Physical Therapy Evaluation & Treatment    Patient Name: Nancy Nuñez  MRN: [id number]  Today's Date: 6/5/2024   Time Calculation  Start Time: 1518  Stop Time: 1618  Time Calculation (min): 60 min    Assessment/Plan   PT Assessment  PT Assessment Results: Decreased strength, Decreased range of motion, Decreased endurance, Decreased mobility, Decreased coordination, Impaired tone, Orthopedic restrictions, Pain  Rehab Prognosis: Good  Evaluation/Treatment Tolerance: Patient limited by pain, Treatment limited secondary to medical complications (Comment) (reports of dizziness/lightheadedness)  Medical Staff Made Aware: Yes  Strengths: Ability to acquire knowledge, Access to adaptive/assistive products, Insight into problems, Rehab experience, Support of Caregivers  Barriers to Participation: Attitude of self, Coping skills  End of Session Communication: Bedside nurse, Charge Nurse, Physician  Assessment Comment: Pt mod complexity eval with evolving status presenting with R foot drop, dizziness, lightheadedness, increased pain, and deficits to functional mobility following R posterior CHIVO performed on 6/5/2024. Education regarding posterior hip precautions provided with handout issued; pt verbalized understanding. Therapeutic exercises performed; HEP handout provided. Pt reporting dizziness and lightheadedness in supine and while sitting EOB; vitals closely monitored and noted. Pt denied feeling symptomatic upon stand and agreeable to ambulate short distance with rolling walker within room; PT provided supervision and cueing. Pt reported multiple times feeling anxious and required education on pursed lip breathing from PT during session. Pt can be self-limiting; required frequent redirection and pt education on post-op pain expectations and importance of participating in functional mobility following  surgery. Pt did demonstrate R foot drop however was able to ambulate with adequate clearance of R toes; surgeon notified.  RN aware of pt presentation and vitals. PT will reassess functional status next session.    End of Session Patient Position: Bed, 3 rail up, Alarm on with B SCD's donned and ice to surgical site. Call light left in reach; patient instructed not to get up on own and verbalized understanding of this. RN aware, present with pt at time of PT exit.     IP OR SWING BED PT PLAN  Inpatient or Swing Bed: Inpatient  PT Plan  Treatment/Interventions: Bed mobility, Transfer training, Gait training, Stair training, Neuromuscular re-education, Strengthening, Endurance training, Range of motion, Therapeutic exercise, Therapeutic activity, Home exercise program, Orthotic fitting/training, Positioning  PT Plan: Skilled PT  PT Frequency: BID  PT Discharge Recommendations: Low intensity level of continued care  Equipment Recommended upon Discharge: Wheeled walker  PT Recommended Transfer Status: Assist x1, Assistive device      Subjective     General Visit Information:  General  Reason for Referral: R posterior CHIVO (6/5/2024)  Referred By: Dr. Rodriguez  Past Medical History Relevant to Rehab: OA, anxiety, depression, HTN, HLD, hypothyroidism, B cataracts, H pylori, osteoporosis; L CHIVO (2022)  Family/Caregiver Present: Yes ( Franck and son Garfield)  Prior to Session Communication: Bedside nurse, Charge Nurse  Patient Position Received: Bed, 3 rail up, Alarm on  General Comment: Session cleared by RN. Pt slightly lethargic but pleasant and agreeable to PT evaluation. C/o dizziness and lightheadedness upon PT arrival. Dressing to R hip dry & intact.     Home Living:  Home Living  Type of Home: House  Lives With: Spouse  Home Adaptive Equipment: Walker rolling or standard, Cane  Home Layout: Two level, Stairs to alternate level with rails  Alternate Level Stairs-Rails: Right  Alternate Level Stairs-Number of Steps: 13  Home Access: Stairs to enter with rails  Entrance Stairs-Rails: Right  Entrance Stairs-Number of Steps:  "1+2+5  Bathroom Shower/Tub: Walk-in shower  Bathroom Equipment: Grab bars in shower, Shower chair with back  Prior Level of Function:  Prior Function Per Pt/Caregiver Report  Level of [place]: Independent with ADLs and functional transfers, Independent with homemaking with ambulation  ADL Assistance: Independent  Homemaking Assistance: Independent  Ambulatory Assistance: Needs assistance  Gait: Assistive device (cane prn)  Vocational: Retired  Prior Function Comments: Pt denies falls prior to admission    Precautions:  Precautions  LE Weight Bearing Status: Weight Bearing as Tolerated  Medical Precautions: Fall precautions  Post-Surgical Precautions: Right hip precautions (posterior)    Vital Signs:  Vital Signs  Heart Rate: 80  Heart Rate Source: Brachial  BP: 82/53 (pt asymptomatic, denied dizziness/LH)  MAP (mmHg): 63  BP Location: Right arm  BP Method: Automatic  Patient Position: Standing    BP (supine): 105/58mmHg MAP: 74 HR: 78bpm (pt reporting dizziness and lightheadedness)  BP (sitting EOB): 99/54mmHg (pt reporting dizziness and lightheadedness)  BP (standing): 82/53 mmHg MAP: 63 HR: 80bpm (pt denied dizziness/lightheadedness)  BP (sitting 1 min following ambulation): 98/56mmHg MAP: 70 HR: 72bpm (pt denied dizziness/lightheadedness, reported feeling anxious and \"foggy\")    Objective   Pain:  Pain Assessment  Pain Assessment: 0-10  Pain Score: 7  Pain Type: Surgical pain  Pain Location: Hip  Pain Orientation: Right  Pain Interventions: Medication (See MAR), Cold applied, Ambulation/increased activity (RN admin pain meds at PT exit)  Cognition:  Cognition  Overall Cognitive Status: Within Functional Limits (pt is A&O x4; answers questions appropriately but with delayed responses to PT questions at start of session, improved as PT eval went on)  Attention: Within Functional Limits  Memory: Within Funtional Limits  Problem Solving: Within Functional Limits  Safety/Judgement: Within Functional " "Limits  Insight: Within function limits  Impulsive: Within functional limits    General Assessments:  Activity Tolerance  Endurance: Tolerates 30 min exercise with multiple rests  Activity Tolerance Comments: pt can be self limiting and requires encouragement from PT to participate in activity. reported dizziness and lightheadedness in supine and at EOB but not standing. when asked to elaborate on symptoms pt reported \"i just feel anxious\". PT provided redirection and education on rehab expectations, pt verbalized understanding and condition improved following this    Sensation  Light Touch: No apparent deficits  Proprioception: No apparent deficits    Coordination  Movements are Fluid and Coordinated: No  Lower Body Coordination: R foot drop did not limit participation in functional mobility    Postural Control  Postural Control: Within Functional Limits    Static Sitting Balance  Static Sitting-Balance Support: Feet supported, Bilateral upper extremity supported  Static Sitting-Level of Assistance: Close supervision (supervision d/t pt closing eyes)  Static Sitting-Comment/Number of Minutes: pt sat EOB approx 10 minutes during session    Static Standing Balance  Static Standing-Balance Support: Bilateral upper extremity supported  Static Standing-Level of Assistance: Close supervision  Static Standing-Comment/Number of Minutes: with rolling walker  Dynamic Standing Balance  Dynamic Standing-Balance Support: Bilateral upper extremity supported  Dynamic Standing-Comments: close supervision with rolling walker  Functional Assessments:  Bed Mobility  Bed Mobility: Yes  Bed Mobility 1  Bed Mobility 1: Supine to sitting, Sitting to supine  Level of Assistance 1: Distant supervision, Minimal verbal cues (cues for body mechanics)    Transfers  Transfer: Yes  Transfer 1  Transfer From 1: Bed to  Transfer to 1: Stand  Technique 1: Sit to stand, Stand to sit  Transfer Device 1: Walker, Gait belt  Transfer Level of Assistance " 1: Close supervision, Minimal verbal cues (cues for hand placement)  Trials/Comments 1: x2    Ambulation/Gait Training  Ambulation/Gait Training Performed: Yes  Ambulation/Gait Training 1  Surface 1: Level tile  Device 1: Rolling walker  Gait Support Devices: Gait belt  Assistance 1: Close supervision, Minimal verbal cues, Minimal tactile cues (verbal cues for sequencing; tactile cues required for pt to complete turns)  Quality of Gait 1: Diminished heel strike, Decreased step length, Foot drop/steppage gait, Antalgic (mild posterior lurch noted; decreased abimbola; step to pattern; pt with R foot drop however able to clear toes without incident)  Comments/Distance (ft) 1: 20 feet  Extremity/Trunk Assessments:  RUE   RUE : Within Functional Limits  LUE   LUE: Within Functional Limits  RLE   RLE : Exceptions to WFL  AROM RLE (degrees)  RLE AROM Comment: R hip flexion to 90 degrees within precautions; decreased hip extension during ambulation  Strength RLE  RLE Overall Strength: Greater than or equal to 3/5 as evidenced by functional mobility, Deficits (R hip, knee WFL)  R Ankle Dorsiflexion: 2-/5  R Ankle Plantar Flexion: 4/5  LLE   LLE : Within Functional Limits  Treatments:  Therapeutic Exercise  Therapeutic Exercise Performed: Yes B ankle pumps, R quad sets, R gluteal sets, R heel slides, R SAQ, and R hip abduction x 10 reps each.      Therapeutic Activity  Therapeutic Activity Performed: Yes  Therapeutic Activity 1: PT provided pt education on posterior hip precautions, HEP, body mechanics and sequencing during functional mobility, use of assistive device, use of call light for assist, POC, post-op pain expectations, importance of mobility following sx; pt verbalized understanding.  Outcome Measures:  [facility] Basic Mobility  Turning from your back to your side while in a flat bed without using bedrails: None  Moving from lying on your back to sitting on the side of a flat bed without using bedrails: None  Moving to  and from bed to chair (including a wheelchair): None  Standing up from a chair using your arms (e.g. wheelchair or bedside chair): None  To walk in hospital room: A little  Climbing 3-5 steps with railing: A little  Basic Mobility - Total Score: 22    Encounter Problems       Encounter Problems (Active)       Balance       LTG - Patient will maintain standing and sitting balance to allow for completion of daily activities (Progressing)       Start:  06/05/24               Compromised Skin Integrity       LTG - Patient will be free from infection (Progressing)       Start:  06/05/24               Mobility       LTG - Patient will recall and demonstrate 3 out of 3 total hip precautions during mobility (Progressing)       Start:  06/05/24            LTG - Patient will ambulate community distance with rolling walker at close supervision  (Progressing)       Start:  06/05/24            LTG - Patient will navigate 13 steps with rails/device at close supervision (Progressing)       Start:  06/05/24               PT Transfers       LTG - Patient will demonstrate safe transfer techniques with rolling walker at distant supervision  (Progressing)       Start:  06/05/24               Pain          Pain - Adult          Safety       LTG - Patient will adhere to hip precautions during ADL's and transfers (Progressing)       Start:  06/05/24                   Education Documentation  Handouts, taught by Janie Herrera PT at 6/5/2024  6:26 PM.  Learner: Family, Significant Other, Patient  Readiness: Acceptance  Method: Explanation, Demonstration, Handout  Response: Verbalizes Understanding, Demonstrated Understanding, Needs Reinforcement    Precautions, taught by Janie Herrera PT at 6/5/2024  6:26 PM.  Learner: Family, Significant Other, Patient  Readiness: Acceptance  Method: Explanation, Demonstration, Handout  Response: Verbalizes Understanding, Demonstrated Understanding, Needs Reinforcement    Body Mechanics, taught by  Janie Herrera PT at 6/5/2024  6:26 PM.  Learner: Family, Significant Other, Patient  Readiness: Acceptance  Method: Explanation, Demonstration, Handout  Response: Verbalizes Understanding, Demonstrated Understanding, Needs Reinforcement    Home Exercise Program, taught by Janie Herrera PT at 6/5/2024  6:26 PM.  Learner: Family, Significant Other, Patient  Readiness: Acceptance  Method: Explanation, Demonstration, Handout  Response: Verbalizes Understanding, Demonstrated Understanding, Needs Reinforcement    Mobility Training, taught by Janie Herrera PT at 6/5/2024  6:26 PM.  Learner: Family, Significant Other, Patient  Readiness: Acceptance  Method: Explanation, Demonstration, Handout  Response: Verbalizes Understanding, Demonstrated Understanding, Needs Reinforcement          Janie Herrera PT, DPT

## 2024-06-05 NOTE — CARE PLAN
Problem: Balance  Goal: LTG - Patient will maintain standing and sitting balance to allow for completion of daily activities  Outcome: Progressing     Problem: Mobility  Goal: LTG - Patient will recall and demonstrate 3 out of 3 total hip precautions during mobility  Outcome: Progressing  Goal: LTG - Patient will ambulate community distance with rolling walker at close supervision   Outcome: Progressing  Goal: LTG - Patient will navigate 13 steps with rails/device at close supervision  Outcome: Progressing     Problem: Safety  Goal: LTG - Patient will adhere to hip precautions during ADL's and transfers  Outcome: Progressing     Problem: PT Transfers  Goal: LTG - Patient will demonstrate safe transfer techniques with rolling walker at distant supervision   Outcome: Progressing     Problem: Pain  Goal: LTG - Patient will manage pain with the appropriate technique/Intervention  Outcome: Progressing     Problem: Compromised Skin Integrity  Goal: LTG - Patient will be free from infection  Outcome: Progressing

## 2024-06-06 ENCOUNTER — DOCUMENTATION (OUTPATIENT)
Dept: HOME HEALTH SERVICES | Facility: HOME HEALTH | Age: 74
End: 2024-06-06
Payer: MEDICARE

## 2024-06-06 VITALS
OXYGEN SATURATION: 97 % | SYSTOLIC BLOOD PRESSURE: 109 MMHG | HEART RATE: 79 BPM | HEIGHT: 66 IN | TEMPERATURE: 97.3 F | BODY MASS INDEX: 21.05 KG/M2 | WEIGHT: 130.95 LBS | DIASTOLIC BLOOD PRESSURE: 64 MMHG | RESPIRATION RATE: 16 BRPM

## 2024-06-06 LAB
ANION GAP SERPL CALC-SCNC: 12 MMOL/L (ref 10–20)
BUN SERPL-MCNC: 10 MG/DL (ref 6–23)
CALCIUM SERPL-MCNC: 8.5 MG/DL (ref 8.6–10.3)
CHLORIDE SERPL-SCNC: 101 MMOL/L (ref 98–107)
CO2 SERPL-SCNC: 22 MMOL/L (ref 21–32)
CREAT SERPL-MCNC: 0.65 MG/DL (ref 0.5–1.05)
EGFRCR SERPLBLD CKD-EPI 2021: >90 ML/MIN/1.73M*2
ERYTHROCYTE [DISTWIDTH] IN BLOOD BY AUTOMATED COUNT: 13.7 % (ref 11.5–14.5)
GLUCOSE SERPL-MCNC: 106 MG/DL (ref 74–99)
HCT VFR BLD AUTO: 29.5 % (ref 36–46)
HGB BLD-MCNC: 9.6 G/DL (ref 12–16)
MCH RBC QN AUTO: 30.7 PG (ref 26–34)
MCHC RBC AUTO-ENTMCNC: 32.5 G/DL (ref 32–36)
MCV RBC AUTO: 94 FL (ref 80–100)
NRBC BLD-RTO: ABNORMAL /100{WBCS}
PLATELET # BLD AUTO: 282 X10*3/UL (ref 150–450)
POTASSIUM SERPL-SCNC: 4.2 MMOL/L (ref 3.5–5.3)
RBC # BLD AUTO: 3.13 X10*6/UL (ref 4–5.2)
SODIUM SERPL-SCNC: 131 MMOL/L (ref 136–145)
WBC # BLD AUTO: 10.3 X10*3/UL (ref 4.4–11.3)

## 2024-06-06 PROCEDURE — 2500000001 HC RX 250 WO HCPCS SELF ADMINISTERED DRUGS (ALT 637 FOR MEDICARE OP)

## 2024-06-06 PROCEDURE — 97116 GAIT TRAINING THERAPY: CPT | Mod: GP

## 2024-06-06 PROCEDURE — 80048 BASIC METABOLIC PNL TOTAL CA: CPT | Performed by: STUDENT IN AN ORGANIZED HEALTH CARE EDUCATION/TRAINING PROGRAM

## 2024-06-06 PROCEDURE — 2500000001 HC RX 250 WO HCPCS SELF ADMINISTERED DRUGS (ALT 637 FOR MEDICARE OP): Performed by: STUDENT IN AN ORGANIZED HEALTH CARE EDUCATION/TRAINING PROGRAM

## 2024-06-06 PROCEDURE — 7100000011 HC EXTENDED STAY RECOVERY HOURLY - NURSING UNIT

## 2024-06-06 PROCEDURE — 2500000004 HC RX 250 GENERAL PHARMACY W/ HCPCS (ALT 636 FOR OP/ED): Mod: JZ | Performed by: STUDENT IN AN ORGANIZED HEALTH CARE EDUCATION/TRAINING PROGRAM

## 2024-06-06 PROCEDURE — 2500000004 HC RX 250 GENERAL PHARMACY W/ HCPCS (ALT 636 FOR OP/ED): Performed by: PHYSICIAN ASSISTANT

## 2024-06-06 PROCEDURE — 36415 COLL VENOUS BLD VENIPUNCTURE: CPT | Performed by: STUDENT IN AN ORGANIZED HEALTH CARE EDUCATION/TRAINING PROGRAM

## 2024-06-06 PROCEDURE — 85027 COMPLETE CBC AUTOMATED: CPT | Performed by: STUDENT IN AN ORGANIZED HEALTH CARE EDUCATION/TRAINING PROGRAM

## 2024-06-06 PROCEDURE — 2500000004 HC RX 250 GENERAL PHARMACY W/ HCPCS (ALT 636 FOR OP/ED): Performed by: FAMILY MEDICINE

## 2024-06-06 PROCEDURE — 97530 THERAPEUTIC ACTIVITIES: CPT | Mod: GP

## 2024-06-06 RX ORDER — AMLODIPINE BESYLATE 2.5 MG/1
2.5 TABLET ORAL DAILY
Start: 2024-06-06

## 2024-06-06 RX ADMIN — DOCUSATE SODIUM 100 MG: 100 CAPSULE, LIQUID FILLED ORAL at 08:21

## 2024-06-06 RX ADMIN — SODIUM CHLORIDE TAB 1 GM 1 G: 1 TAB at 08:21

## 2024-06-06 RX ADMIN — LEVOTHYROXINE SODIUM 112 MCG: 0.11 TABLET ORAL at 06:13

## 2024-06-06 RX ADMIN — PANTOPRAZOLE SODIUM 40 MG: 40 TABLET, DELAYED RELEASE ORAL at 06:13

## 2024-06-06 RX ADMIN — KETOROLAC TROMETHAMINE 15 MG: 15 INJECTION, SOLUTION INTRAMUSCULAR; INTRAVENOUS at 08:21

## 2024-06-06 RX ADMIN — OXYCODONE HYDROCHLORIDE 10 MG: 5 TABLET ORAL at 07:45

## 2024-06-06 RX ADMIN — POLYETHYLENE GLYCOL 3350 17 G: 17 POWDER, FOR SOLUTION ORAL at 08:21

## 2024-06-06 RX ADMIN — SODIUM CHLORIDE 1000 ML: 900 INJECTION, SOLUTION INTRAVENOUS at 09:03

## 2024-06-06 RX ADMIN — SODIUM CHLORIDE 100 ML/HR: 900 INJECTION, SOLUTION INTRAVENOUS at 01:19

## 2024-06-06 RX ADMIN — CEFAZOLIN SODIUM 2 G: 2 INJECTION, SOLUTION INTRAVENOUS at 01:05

## 2024-06-06 RX ADMIN — ASPIRIN 81 MG: 81 TABLET, COATED ORAL at 09:39

## 2024-06-06 RX ADMIN — KETOROLAC TROMETHAMINE 15 MG: 15 INJECTION, SOLUTION INTRAMUSCULAR; INTRAVENOUS at 02:45

## 2024-06-06 ASSESSMENT — COGNITIVE AND FUNCTIONAL STATUS - GENERAL
CLIMB 3 TO 5 STEPS WITH RAILING: A LITTLE
MOBILITY SCORE: 23
STANDING UP FROM CHAIR USING ARMS: A LITTLE
MOVING TO AND FROM BED TO CHAIR: A LOT
WALKING IN HOSPITAL ROOM: A LITTLE
DAILY ACTIVITIY SCORE: 21
TOILETING: A LITTLE
DRESSING REGULAR LOWER BODY CLOTHING: A LITTLE
CLIMB 3 TO 5 STEPS WITH RAILING: A LITTLE
HELP NEEDED FOR BATHING: A LITTLE
MOBILITY SCORE: 19

## 2024-06-06 ASSESSMENT — PAIN SCALES - WONG BAKER: WONGBAKER_NUMERICALRESPONSE: HURTS LITTLE MORE

## 2024-06-06 ASSESSMENT — PAIN SCALES - GENERAL
PAINLEVEL_OUTOF10: 0 - NO PAIN
PAINLEVEL_OUTOF10: 0 - NO PAIN
PAINLEVEL_OUTOF10: 8
PAINLEVEL_OUTOF10: 0 - NO PAIN
PAINLEVEL_OUTOF10: 0 - NO PAIN

## 2024-06-06 ASSESSMENT — PAIN - FUNCTIONAL ASSESSMENT
PAIN_FUNCTIONAL_ASSESSMENT: 0-10
PAIN_FUNCTIONAL_ASSESSMENT: 0-10
PAIN_FUNCTIONAL_ASSESSMENT: WONG-BAKER FACES
PAIN_FUNCTIONAL_ASSESSMENT: 0-10
PAIN_FUNCTIONAL_ASSESSMENT: 0-10

## 2024-06-06 ASSESSMENT — PAIN DESCRIPTION - DESCRIPTORS: DESCRIPTORS: ACHING

## 2024-06-06 NOTE — PROGRESS NOTES
Interdisciplinary Rounds were completed at the bedside with Patient.  Staff participating in rounds included: Clinical Nurse Orthopedic Coordinator Transitional Care Coordinator Nursing Leadership Hospitalist MD/PA.  Topics discussed included: Today's Plan of Care Discharge Plan and Accommodations Physical Therapy Medications/Preferred Pharmacy and the Patient was given the opportunity to ask additional questions or bring up any concerns at that time.  During the final discharge discussion and review of instructions, they will have another opportunity to review questions or concerns prior to leaving our care.  Patient was given information on who to call post-discharge should new questions or concerns arise.      The patient's plan includes:    Discharge Date/Disposition:  Home, Today with, Home Care Services; pending PT eval  Discharge Needs: No Equipment Needs Identified  Medications/Pharmacy: Patient will  discharge prescriptions at primary pharmacy on file

## 2024-06-06 NOTE — HH CARE COORDINATION
Home Care received a Referral for Physical Therapy and Occupational Therapy. We have processed the referral for a Start of Care on 24 HOURS .     If you have any questions or concerns, please feel free to contact us at [phone]. Follow the prompts, enter your five digit zip code, and you will be directed to your care team on [facility].

## 2024-06-06 NOTE — PROGRESS NOTES
Physical Therapy Treatment    Patient Name: Nancy Nuñez  MRN: [id number]  Today's Date: 6/6/2024  Time Calculation  Start Time: 1108  Stop Time: 1153  Time Calculation (min): 45 min    Assessment/Plan   PT Assessment  PT Assessment Results: Decreased strength, Decreased range of motion, Impaired balance, Decreased mobility, Decreased cognition, Orthopedic restrictions, Pain  Rehab Prognosis: Good  Evaluation/Treatment Tolerance: Patient tolerated treatment well, Treatment limited secondary to medical complications (Comment) (limited by weakness)  Medical Staff Made Aware: Yes  Strengths: Access to adaptive/assistive products, Support of Caregivers, Premorbid level of function, Rehab experience  Barriers to Participation: Comorbidities, Coping skills, Housing layout  End of Session Communication: Bedside nurse, Physician (Dr. Avila notified of drop foot)  Assessment Comment: Pt is progressing towards goals with emphasis on ambulation/stair navigation with safe return home. Pt tolerated well with supervision for tasks, however did have limited DF on  RLE compared to yesterday and Dr avila notified of change. Pt/family understanding of HEP and hip precautions. PT recommends 24 hour supervision/assistance and [facility] PT. PT recommended calling Dr. Rodriguez if DF does not improve.  End of Session Patient Position: Up in chair, Alarm on  PT Plan  Inpatient/Swing Bed or Outpatient: Inpatient  PT Plan  Treatment/Interventions: Bed mobility, Transfer training, Gait training, Stair training, Balance training, Strengthening, Range of motion, Therapeutic activity, Positioning  PT Plan: Skilled PT  PT Frequency: BID  PT Discharge Recommendations: Low intensity level of continued care  Equipment Recommended upon Discharge: Wheeled walker, Straight cane  PT Recommended Transfer Status: Assistive device, Stand by assist  PT - OK to Discharge: Yes      General Visit Information:   PT  Visit  PT Received On: 06/06/24  Response to Previous  "Treatment: Other (Comment) (pt reported continued dizziness since last session and reporting having weak DF on R foot compared to last session. Dr. Avila notified by PT of situation.)  General  Reason for Referral: R posterior THR  Family/Caregiver Present: Yes  Prior to Session Communication: Bedside nurse  Patient Position Received: Bed, 2 rail up  General Comment: cleared by RN and agreeable t o session. pt is reporting feeling anxious and can not tell if she is dizzy or \"just tired\"    Subjective   Precautions:  Precautions  LE Weight Bearing Status: Weight Bearing as Tolerated  Medical Precautions: Fall precautions  Post-Surgical Precautions: Right hip precautions  Precautions Comment: posterior hip precautions 6-8 weeks  Vital Signs:       Objective   Pain:  Pain Assessment  Pain Assessment: Campo-Baker FACES  Campo-Baker FACES Pain Rating: Hurts little more  Pain Type: Surgical pain  Pain Location: Hip  Pain Orientation: Right  Pain Descriptors: Aching  Pain Interventions: Cold applied, Rest, Emotional support, Distraction  Response to Interventions: continued therapy and not stating pain/ no facial expression of pain  Cognition:  Cognition  Overall Cognitive Status:  (able to answer questions and ask questions appropriately however there may be possible cognitive limitations)  Attention: Within Functional Limits  Memory: Within Funtional Limits  Problem Solving: Exceptions to WFL  Safety/Judgement: Exceptions to WFL  Insight: Moderate  Impulsive: Mildly  Processing Speed: Delayed  Coordination:  Movements are Fluid and Coordinated: Yes  Postural Control:  Postural Control  Postural Control: Within Functional Limits  Static Sitting Balance  Static Sitting-Balance Support: Bilateral upper extremity supported, Feet supported  Static Sitting-Level of Assistance: Distant supervision  Dynamic Sitting Balance  Dynamic Sitting-Balance Support: Bilateral upper extremity supported, Feet supported  Dynamic " Sitting-Comments: close supervision  Static Standing Balance  Static Standing-Balance Support: Bilateral upper extremity supported  Static Standing-Level of Assistance: Distant supervision  Static Standing-Comment/Number of Minutes: with RW  Dynamic Standing Balance  Dynamic Standing-Balance Support: Bilateral upper extremity supported  Dynamic Standing-Comments: close supervision with RW  Extremity/Trunk Assessments:  RUE   RUE : Within Functional Limits  LUE   LUE: Within Functional Limits  RLE   RLE : Exceptions to WFL  AROM RLE (degrees)  RLE AROM Comment: WFL for completion of tasks except for limited DF from weakness  Strength RLE  R Ankle Dorsiflexion: 2/5  R Ankle Plantar Flexion: 4+/5  LLE   LLE : Within Functional Limits  Activity Tolerance:     Treatments:  Therapeutic Exercise  Therapeutic Exercise Performed: No    Therapeutic Activity  Therapeutic Activity Performed: Yes  Therapeutic Activity 1: pt toileted without PT assistance for hygiene or hand hygiene; PT did provide distant supervision once pt was seated and close supervision with cueing for pt navigating commode  Therapeutic Activity 2: pt dressed BUE without assistance and BLE with assist to maintain hip precautions. pt able to stand and adjust clothing without RW usage or UE support however PT with CSx1 for safety.    Bed Mobility  Bed Mobility: Yes  Bed Mobility 1  Bed Mobility 1: Supine to sitting  Level of Assistance 1: Independent    Ambulation/Gait Training  Ambulation/Gait Training Performed: Yes  Ambulation/Gait Training 1  Device 1: Rolling walker  Assistance 1: Close supervision  Quality of Gait 1: Diminished heel strike, Decreased step length, Foot drop/steppage gait, Antalgic  Comments/Distance (ft) 1: ambulated 15 feetx2 with RW at CSx1, cueing from PT for stepping pattern and making sure to compensate for lack of DF during ambulation. no buckle or LOB  Ambulation/Gait Training 2  Device 2: Rolling walker  Assistance 2: Close  supervision, Distant supervision  Quality of Gait 2: Diminished heel strike, Foot drop/steppage gait, Decreased step length, Antalgic  Comments/Distance (ft) 2: additional 100 feetx1 and 120 feetx2; no buckle or LOB; denied feeling faint or nauseous  Transfers  Transfer: Yes  Transfer 1  Transfer Device 1: Walker  Transfer Level of Assistance 1: Close supervision, Contact guard  Trials/Comments 1: sit to stand - CGx1 to CSx1 and stand to sit CSx1, cueing for hand placement and safety to not pull from walker;    Stairs  Stairs: Yes  Stairs  Rails 1: Right  Curb Step 1: No  Device 1: Railing  Assistance 1: Close supervision  Comment/Number of Steps 1: use of railing and cane; 6 steps completed step to pattern, cueing for stepping pattern and cane usage; assisted with cane placement on steps    Outcome Measures:  [facility] Basic Mobility  Turning from your back to your side while in a flat bed without using bedrails: None  Moving from lying on your back to sitting on the side of a flat bed without using bedrails: None  Moving to and from bed to chair (including a wheelchair): None  Standing up from a chair using your arms (e.g. wheelchair or bedside chair): None  To walk in hospital room: None  Climbing 3-5 steps with railing: A little  Basic Mobility - Total Score: 23    Education Documentation  No documentation found.  Education Comments  No comments found.        OP EDUCATION:  Outpatient Education  Individual(s) Educated: Patient, Spouse, Child  Education Provided: Body Mechanics, Fall Risk, Home Exercise Program, Home Safety, Post-Op Precautions, POC  Equipment: Ice Pack  Risk and Benefits Discussed with Patient/Caregiver/Other: yes  Patient/Caregiver Demonstrated Understanding: yes  Plan of Care Discussed and Agreed Upon: yes  Patient Response to Education: Patient/Caregiver Verbalized Understanding of Information, Patient/Caregiver Performed Return Demonstration of Exercises/Activities, Patient/Caregiver Asked  Appropriate Questions    Encounter Problems       Encounter Problems (Active)       Balance       LTG - Patient will maintain standing and sitting balance to allow for completion of daily activities (Progressing)       Start:  06/05/24               Compromised Skin Integrity       LTG - Patient will be free from infection (Progressing)       Start:  06/05/24               Mobility       LTG - Patient will recall and demonstrate 3 out of 3 total hip precautions during mobility (Progressing)       Start:  06/05/24            LTG - Patient will ambulate community distance with rolling walker at close supervision  (Progressing)       Start:  06/05/24            LTG - Patient will navigate 13 steps with rails/device at close supervision (Progressing)       Start:  06/05/24               PT Transfers       LTG - Patient will demonstrate safe transfer techniques with rolling walker at distant supervision  (Progressing)       Start:  06/05/24               Pain          Pain - Adult          Safety       LTG - Patient will adhere to hip precautions during ADL's and transfers (Progressing)       Start:  06/05/24                   Rachell Lott, PT, DPT

## 2024-06-06 NOTE — NURSING NOTE
Admission vital signs obtained.  Pt. Oriented to the room.  Encouraged pt. To order food.  Call light within reach.  Bed alarm on.  Scds on.  
Assumed care of patient from PACU, nurse to nurse report given by Yajaira.  Patient underwent a right total hip today performed by Dr. Rodriguez.  Patient experienced elevated BP during the case, hydrazaline and labetolol was administered.  While in PACU the patient began experiencing low BP's.  Medication was given to raise her BP.  Now on the nursing unit her BP is 95/52.  Patient is on LR at 100ml/hr currently. Patient is alert, awake but groggy.  History of major depression which lead to her taking a PRN Ativan this morning prior to coming in to the procedure. Family is at bedside.  Oriented patient to room, TV, menu and call light.   
Assumed care of patient this am.  Assisted patient from bathroom back to bed with the use of the walker and a slow steady gait.She voices no c/o pain/discomfort. She is rating the right hip pain at 8/10.   Call light within reach, continue to monitor.  
Blood pressure after the normal saline bolus was:105/51  Continue to monitor.  
Patient alert, oriented x 4. Sitting up in bed, son assisting with meal. BP 89/50, pt reports mild lightheadedness. IVF infusing as ordered. PA notified. No new orders at this time. BMP results pending.   
Patient ambulated to and from bathroom with assistance x2  via walker, void 400cc yellow clear urine. Back to bed, call light within reach bed alarm on.  
Patient ambulated to and from bathroom with assistancex2 via walker gait is steady. Pt was given prn Ativan 0.5mg tab for anxiety,, per request at 2124. Pt was given scheduled toradol 15mg ivp @2128 to help with right hip pain. No further needs noted, call light is within reach.  
Patient ambulated to bathroom with nurse, did well.  Void clear yellow urine 450cc.   
Patient assessment completed at this time, c/o pain right hip 78/10 describes as aching sore pain requested pain meds. Pt is A&Ox4 follows commands appropriately neuro checks completed. No further needs noted, call light is within reach.  
Patient c/o feeling dizzy. Blood pressure taken and was 93/49. Notified Dr. Hardy and he stated he would order a fluid bolus.    
Patient has no c/o pain or discomfort at this time. Call light is within reach.,  
Patient is currently sleeping at this time, breathing is regular and unlabored. Call light is within reach.  
Patient is laying in bed. She is alert but sleepy and says she is dizzy. Tolerated half of her meal and is tolerating fluids. Family at bedside. Lungs clear, moves all extremities but right leg is weak and push pulls in her lower extremities are much weaker on the right. She can wiggle;e toes. Peripheral pulses are strong bilaterally at the radius and dorsalis pedis. BP is improving. No complaint of pain at this time. Will continue to monitor.  
Patient is resting comfortably in bed, no c/o pain noted. Call light is within reach.  
Patient states no pain at this time. Family at bedside.  
Patient was given melatonin 10mg by mouth as ordered for sleep at this time.  No further needs noted, or c/o pain at this time. Call light is within reach.  
Patient was given prn oxy-ir 10mg by mouth as ordered for right hip pain. Pt was educated regarding medication and verbalizes understanding. No further needs noted, call light is within reach.  
Started Normal Saline fluid bolus.  
No

## 2024-06-06 NOTE — CARE PLAN
TCC met with patient at the bedside to discuss care/discharge plan.  Plan is to work with PT and then transition home today with [facility] PT/OT with a SOC of 6/7/24.  Family with transport home and assist with care as needed.

## 2024-06-06 NOTE — CARE PLAN
The patient's goals for the shift include no pain    The clinical goals for the shift include pain control    Over the shift, the patient did not make progress toward the following goals. Barriers to progression include pain meds, ice packs and reposition for comfort.. Recommendations to address these barriers include pain meds, ice packs and reposition for comfort.

## 2024-06-06 NOTE — PROGRESS NOTES
Progress Note:    Nancy Nuñez is a 74 y.o. female on day 2 of admission presenting with Unilateral primary osteoarthritis, right hip.    Subjective   Ms. Nuñez reports significant pain this morning.  It is producing some anxiety as she anticipates her session with physical therapy.  She just received oxycodone and is concerned that the pain may be too much for her to have physical therapy at all.     ROS  Constitutional:  Alert, oriented  HEENT: Denies headache, vision changes, hearing change, loss of taste or smell. Does complain of sore throat (post op)   Neck: Denies swallowing difficulty, swelling, new stiffness/pain  CV: Denies CP, Palpitations, chest wall pain  Resp: No cough, wheeze, dyspnea  Abdomen: Denies abdominal pain, cramping, constipation, diarrhea  : No dysuria, hematuria, discharge  Musculoskeletal: Aches over all joints (not new). Generalized weakness 2/2 pain  Extremities: Swelling of RLE  Neuro: No focal deficits other than mild [place]    Scheduled medications  [Held by provider] amLODIPine, 2.5 mg, oral, Daily  aspirin, 81 mg, oral, BID  docusate sodium, 100 mg, oral, BID  escitalopram, 20 mg, oral, Nightly  ketorolac, 15 mg, intravenous, q6h  levothyroxine, 112 mcg, oral, Daily  pantoprazole, 40 mg, oral, Daily before breakfast  polyethylene glycol, 17 g, oral, Daily  pravastatin, 20 mg, oral, Nightly  scopolamine, 1 patch, transdermal, q72h  scopolamine, 1 patch, transdermal, Once  sodium chloride, 1 g, oral, TID      Continuous medications  sodium chloride 0.9%, 100 mL/hr, Last Rate: 100 mL/hr (06/06/24 0119)      PRN medications  PRN medications: acetaminophen, bisacodyl, bisacodyl, cyclobenzaprine, diphenhydrAMINE, HYDROmorphone, LORazepam, metoclopramide **OR** metoclopramide, naloxone, ondansetron ODT **OR** ondansetron, oxyCODONE, oxyCODONE, oxygen      Physical Exam  General: No acute distress, alert & oriented  Cardiac: RRR, NL S1 and S2, no murmurs, rubs or gallops  Pulmonary:  "Lungs clear to auscultation bilaterally, no wheezes, rhales or rhonchi  Abdomen: Soft, non-tender, non-distended  Extremities: No clubbing , cyanosis or edema.     Last Recorded Vitals  Blood pressure 113/59, pulse 77, temperature 36.8 °C (98.2 °F), temperature source Temporal, resp. rate 16, height 1.676 m (5' 5.98\"), weight 59.4 kg (130 lb 15.3 oz), SpO2 96%.    Scheduled medications   Medication Dose Route Frequency    [Held by provider] amLODIPine  2.5 mg oral Daily    aspirin  81 mg oral BID    docusate sodium  100 mg oral BID    escitalopram  20 mg oral Nightly    ketorolac  15 mg intravenous q6h    levothyroxine  112 mcg oral Daily    pantoprazole  40 mg oral Daily before breakfast    polyethylene glycol  17 g oral Daily    pravastatin  20 mg oral Nightly    scopolamine  1 patch transdermal q72h    scopolamine  1 patch transdermal Once    sodium chloride  1 g oral TID       Relevant Results  Results from last 7 days   Lab Units 06/06/24  0539   WBC AUTO x10*3/uL 10.3   HEMOGLOBIN g/dL 9.6*   HEMATOCRIT % 29.5*   PLATELETS AUTO x10*3/uL 282      Results from last 7 days   Lab Units 06/06/24  0539 06/05/24  1307   SODIUM mmol/L 131* 129*   POTASSIUM mmol/L 4.2 3.9   CHLORIDE mmol/L 101 97*   CO2 mmol/L 22 25   BUN mg/dL 10 12   CREATININE mg/dL 0.65 0.76   GLUCOSE mg/dL 106* 139*   CALCIUM mg/dL 8.5* 8.9      Results for orders placed or performed during the hospital encounter of 06/05/24 (from the past 24 hour(s))   Basic Metabolic Panel   Result Value Ref Range    Glucose 139 (H) 74 - 99 mg/dL    Sodium 129 (L) 136 - 145 mmol/L    Potassium 3.9 3.5 - 5.3 mmol/L    Chloride 97 (L) 98 - 107 mmol/L    Bicarbonate 25 21 - 32 mmol/L    Anion Gap 11 10 - 20 mmol/L    Urea Nitrogen 12 6 - 23 mg/dL    Creatinine 0.76 0.50 - 1.05 mg/dL    eGFR 82 >60 mL/min/1.73m*2    Calcium 8.9 8.6 - 10.3 mg/dL   CBC   Result Value Ref Range    WBC 10.3 4.4 - 11.3 x10*3/uL    nRBC      RBC 3.13 (L) 4.00 - 5.20 x10*6/uL    Hemoglobin " 9.6 (L) 12.0 - 16.0 g/dL    Hematocrit 29.5 (L) 36.0 - 46.0 %    MCV 94 80 - 100 fL    MCH 30.7 26.0 - 34.0 pg    MCHC 32.5 32.0 - 36.0 g/dL    RDW 13.7 11.5 - 14.5 %    Platelets 282 150 - 450 x10*3/uL   Basic metabolic panel   Result Value Ref Range    Glucose 106 (H) 74 - 99 mg/dL    Sodium 131 (L) 136 - 145 mmol/L    Potassium 4.2 3.5 - 5.3 mmol/L    Chloride 101 98 - 107 mmol/L    Bicarbonate 22 21 - 32 mmol/L    Anion Gap 12 10 - 20 mmol/L    Urea Nitrogen 10 6 - 23 mg/dL    Creatinine 0.65 0.50 - 1.05 mg/dL    eGFR >90 >60 mL/min/1.73m*2    Calcium 8.5 (L) 8.6 - 10.3 mg/dL      Assessment/Plan   1) Congenital Right Hip Dysplasia: POD#1 Right THR:   Perioperative antibiotics, dressing care, pain meds, PT/OT per Orthopaedics   Patient seen, examined. Notes, labs, vitals reviewed.    Currently on Dilaudid, Tylenol, & Oxycodone-wean as tolerated    2) DVT Prophylaxis:   Rx: Start Aspirin 81 mg q 12 hr. Stop date: July 4, 2024     3) Hypertension:   BP this morning 113/59. Holding Amlodipine 2.5 mg daily     4) Chronic Hyponatremia:   Baseline Na+ low 130s. This morning Na+= 131 (129) - follow     5) MILY w/ Depression:   She has considerable angst re. scheduled PT   Rx: Continue Lexapro 20 mg daily    Rx: Continue Lorazepam 0.5 mg q 6 hr prn        I spent 35 minutes in the professional and overall care of this patient.      Dre Hardy MD

## 2024-06-06 NOTE — PROGRESS NOTES
"Orthopaedic Surgery Progress Note    S:    Endorsing some weakness with dorsiflexion this AM. Pain controlled on current regimen.  Denies any new onset numbness or tingling. Denies nausea, vomiting, chest pain, dyspnea, or calf tenderness. Denies any fever or chills. Cleared PT.     O:  /64 (BP Location: Left arm, Patient Position: Lying)   Pulse 79   Temp 36.3 °C (97.3 °F) (Temporal)   Resp 16   Ht 1.676 m (5' 5.98\")   Wt 59.4 kg (130 lb 15.3 oz)   SpO2 97%   BMI 21.15 kg/m²     GEN - NAD, resting comfortably in hospital bed  HEENT - MMM, EOMI, NCAT   CV - RRR by peripheral palpation, limbs wwp  PULM - NWOB   ABD - Non-distended  NEURO - HAGEN spontaneously, CNs II - XII grossly intact  PSYCH - Appropriate mood and affect    MSK:  RLE:   -Post-operative dressing/splint in place without strikethrough bleeding.   -Full strength in PF/FHL; weakly fires DF/EHL and able to maintain some dorsiflexion after done passively  -SILT in saph/sural/SPN/DPN/tibial distributions  -Foot wwp, brisk cap refill, 2+ DP pulse      A/P: 74F PMH HTN, HLD, GERD, hypothyroid, anxiety/depression S/P R CHIVO with Dr. Rodriguez on 6/5 with weak DF/EHL post-operatively but no loss of sensation.     -Clear liquid diet. Advance diet as tolerated  -Bowel regimen of colace, senna, miralax, and dulcolax PRN  -Tylenol, oxy 5/10, dilaudid PRN, IV toradol 15mg q6hr x 4 doses for pain management  -Continue LR@100; HLIV with good PO intake, BMP POD1  -PT/OT consult; WB status: WBAT; Posterior hip precautions: do not cross leg over midline, do not bend at waist more than 90 degrees, do not rotate foot or leg inwards.  -Encourage IS  -Perioperative abx - ancef 24 hours  -F/u CBC in AM, No indication for transfusion; 1x PO TXA on floor  -DVT ppx: SCDs + TEDs, ASA 81 bid   -Continue home medications - amlodipine, escitalopram, synthroid, ativan, PPI, statin  -Glycemic: No issues    Dispo: Okay for dc today. Will monitor nerve function during " PT/follow up.     Plan was discussed with Dr. Rodriguez.     Madan Avila MD, PGY-4  Orthopedic Surgery  [id number]  Epic Chat Preferred

## 2024-06-07 ENCOUNTER — HOME CARE VISIT (OUTPATIENT)
Dept: HOME HEALTH SERVICES | Facility: HOME HEALTH | Age: 74
End: 2024-06-07
Payer: MEDICARE

## 2024-06-07 PROCEDURE — 169592 NO-PAY CLAIM PROCEDURE

## 2024-06-07 PROCEDURE — 0023 HH SOC

## 2024-06-07 PROCEDURE — G0151 HHCP-SERV OF PT,EA 15 MIN: HCPCS | Mod: HHH

## 2024-06-07 SDOH — HEALTH STABILITY: PHYSICAL HEALTH: EXERCISE ACTIVITY: ANKLE PUMPS

## 2024-06-07 SDOH — HEALTH STABILITY: PHYSICAL HEALTH: PHYSICAL EXERCISE: 10

## 2024-06-07 SDOH — HEALTH STABILITY: PHYSICAL HEALTH: PHYSICAL EXERCISE: SUPINE

## 2024-06-07 SDOH — HEALTH STABILITY: PHYSICAL HEALTH: EXERCISE ACTIVITY: SAQ

## 2024-06-07 SDOH — HEALTH STABILITY: PHYSICAL HEALTH: EXERCISE ACTIVITY: QS

## 2024-06-07 SDOH — HEALTH STABILITY: PHYSICAL HEALTH: EXERCISE ACTIVITY: GS

## 2024-06-07 SDOH — HEALTH STABILITY: PHYSICAL HEALTH: EXERCISE ACTIVITY: HIP ABD

## 2024-06-07 SDOH — HEALTH STABILITY: PHYSICAL HEALTH: EXERCISE ACTIVITY: HEEL SLIDES

## 2024-06-07 ASSESSMENT — ENCOUNTER SYMPTOMS
PAIN LOCATION - PAIN SEVERITY: 8/10
LOWEST PAIN SEVERITY IN PAST 24 HOURS: 8/10
HIGHEST PAIN SEVERITY IN PAST 24 HOURS: 10/10
PAIN: 1
PERSON REPORTING PAIN: PATIENT
MUSCLE WEAKNESS: 1
PAIN LOCATION: RIGHT HIP

## 2024-06-07 ASSESSMENT — ACTIVITIES OF DAILY LIVING (ADL)
ENTERING_EXITING_HOME: NEEDS ASSISTANCE
OASIS_M1830: 03
AMBULATION_DISTANCE/DURATION_TOLERATED: 25 FEET

## 2024-06-10 ENCOUNTER — HOME CARE VISIT (OUTPATIENT)
Dept: HOME HEALTH SERVICES | Facility: HOME HEALTH | Age: 74
End: 2024-06-10
Payer: MEDICARE

## 2024-06-10 VITALS — RESPIRATION RATE: 14 BRPM

## 2024-06-10 PROCEDURE — G0152 HHCP-SERV OF OT,EA 15 MIN: HCPCS | Mod: HHH

## 2024-06-10 PROCEDURE — G0151 HHCP-SERV OF PT,EA 15 MIN: HCPCS | Mod: HHH

## 2024-06-10 SDOH — HEALTH STABILITY: PHYSICAL HEALTH: EXERCISE TYPE: HIP

## 2024-06-10 SDOH — HEALTH STABILITY: PHYSICAL HEALTH: PHYSICAL EXERCISE: 15

## 2024-06-10 SDOH — HEALTH STABILITY: PHYSICAL HEALTH: PHYSICAL EXERCISE: SIT, SUPINE,   STAND

## 2024-06-10 SDOH — HEALTH STABILITY: PHYSICAL HEALTH: EXERCISE ACTIVITY: HIP 3 WAY, TKE,   QS, GS

## 2024-06-10 ASSESSMENT — ACTIVITIES OF DAILY LIVING (ADL)
CURRENT_FUNCTION: MINIMUM ASSIST
BATHING ASSESSED: 1
DRESSING_LB_CURRENT_FUNCTION: MODERATE ASSIST
PHYSICAL TRANSFERS ASSESSED: 1
AMBULATION ASSISTANCE ON FLAT SURFACES: 1
TOILETING: 1
TOILETING: CONTACT GUARD ASSIST
BATHING_CURRENT_FUNCTION: MINIMUM ASSIST
PHYSICAL TRANSFERS ASSESSED: 1
AMBULATION_DISTANCE/DURATION_TOLERATED: 100 FT
CURRENT_FUNCTION: MINIMUM ASSIST

## 2024-06-10 ASSESSMENT — ENCOUNTER SYMPTOMS
SUBJECTIVE PAIN PROGRESSION: UNCHANGED
PAIN: 1
PAIN LOCATION: RIGHT HIP
SUBJECTIVE PAIN PROGRESSION: WAXING AND WANING
PAIN: 1
MUSCLE WEAKNESS: 1
PAIN LOCATION - RELIEVING FACTORS: CP, MEDS
PAIN LOCATION - PAIN FREQUENCY: FREQUENT
PERSON REPORTING PAIN: PATIENT
PAIN SEVERITY GOAL: 0/10
PERSON REPORTING PAIN: PATIENT
PAIN LOCATION: RIGHT HIP
PAIN LOCATION - PAIN QUALITY: ACHE
PAIN LOCATION - PAIN DURATION: MIN
LOWEST PAIN SEVERITY IN PAST 24 HOURS: 2/10
LIMITED RANGE OF MOTION: 1
HIGHEST PAIN SEVERITY IN PAST 24 HOURS: 5/10
PAIN LOCATION - EXACERBATING FACTORS: ROM
LOWEST PAIN SEVERITY IN PAST 24 HOURS: 4/10
PAIN LOCATION - PAIN SEVERITY: 5/10
HIGHEST PAIN SEVERITY IN PAST 24 HOURS: 6/10

## 2024-06-11 ENCOUNTER — TELEPHONE (OUTPATIENT)
Dept: ORTHOPEDIC SURGERY | Facility: HOSPITAL | Age: 74
End: 2024-06-11
Payer: MEDICARE

## 2024-06-11 DIAGNOSIS — M16.11 UNILATERAL PRIMARY OSTEOARTHRITIS, RIGHT HIP: ICD-10-CM

## 2024-06-11 NOTE — TELEPHONE ENCOUNTER
PATIENT IS EXPERIENCING FOOT DROP ON THE RIGHT SIDE SINCE HER SURGERY ON 6/5/24 RIGHT CHIVO.  PATIENT WANT TO KNOW IF SHE CAN GET A PRESCRIPTION FOR AN AFO RIGHT FOOT BRACE?  I COULD NOT LOCATE ONE IN EPIC TO SEND AN ORDER.

## 2024-06-12 ENCOUNTER — HOME CARE VISIT (OUTPATIENT)
Dept: HOME HEALTH SERVICES | Facility: HOME HEALTH | Age: 74
End: 2024-06-12
Payer: MEDICARE

## 2024-06-12 VITALS
OXYGEN SATURATION: 99 % | DIASTOLIC BLOOD PRESSURE: 80 MMHG | HEART RATE: 55 BPM | TEMPERATURE: 98.5 F | SYSTOLIC BLOOD PRESSURE: 150 MMHG

## 2024-06-12 PROCEDURE — G0157 HHC PT ASSISTANT EA 15: HCPCS | Mod: CQ,HHH

## 2024-06-12 ASSESSMENT — ENCOUNTER SYMPTOMS
LOWEST PAIN SEVERITY IN PAST 24 HOURS: 8/10
PAIN LOCATION: RIGHT HIP
PAIN: 1
PAIN LOCATION - PAIN QUALITY: ACHING, DULL
SUBJECTIVE PAIN PROGRESSION: UNCHANGED
PAIN LOCATION - PAIN SEVERITY: 9/10
PERSON REPORTING PAIN: PATIENT
HIGHEST PAIN SEVERITY IN PAST 24 HOURS: 10/10

## 2024-06-17 ENCOUNTER — HOME CARE VISIT (OUTPATIENT)
Dept: HOME HEALTH SERVICES | Facility: HOME HEALTH | Age: 74
End: 2024-06-17
Payer: MEDICARE

## 2024-06-17 VITALS
OXYGEN SATURATION: 98 % | DIASTOLIC BLOOD PRESSURE: 76 MMHG | TEMPERATURE: 97.7 F | SYSTOLIC BLOOD PRESSURE: 122 MMHG | HEART RATE: 71 BPM

## 2024-06-17 PROCEDURE — G0157 HHC PT ASSISTANT EA 15: HCPCS | Mod: CQ,HHH

## 2024-06-17 PROCEDURE — G0152 HHCP-SERV OF OT,EA 15 MIN: HCPCS | Mod: HHH

## 2024-06-17 ASSESSMENT — ACTIVITIES OF DAILY LIVING (ADL)
DRESSING_LB_CURRENT_FUNCTION: MINIMUM ASSIST
TOILETING: MODERATE ASSIST
TOILETING: 1
BATHING_CURRENT_FUNCTION: MINIMUM ASSIST
PHYSICAL TRANSFERS ASSESSED: 1
ADLS_COMMENTS: WN
BATHING ASSESSED: 1
CURRENT_FUNCTION: MINIMUM ASSIST

## 2024-06-17 ASSESSMENT — ENCOUNTER SYMPTOMS
HIGHEST PAIN SEVERITY IN PAST 24 HOURS: 4/10
PERSON REPORTING PAIN: PATIENT
PAIN: 1
PAIN LOCATION: RIGHT HIP
SUBJECTIVE PAIN PROGRESSION: GRADUALLY IMPROVING
LOWEST PAIN SEVERITY IN PAST 24 HOURS: 2/10

## 2024-06-21 ENCOUNTER — HOME CARE VISIT (OUTPATIENT)
Dept: HOME HEALTH SERVICES | Facility: HOME HEALTH | Age: 74
End: 2024-06-21
Payer: MEDICARE

## 2024-06-21 VITALS
HEART RATE: 80 BPM | SYSTOLIC BLOOD PRESSURE: 100 MMHG | DIASTOLIC BLOOD PRESSURE: 62 MMHG | TEMPERATURE: 97.7 F | OXYGEN SATURATION: 98 %

## 2024-06-21 PROCEDURE — G0157 HHC PT ASSISTANT EA 15: HCPCS | Mod: CQ,HHH

## 2024-06-21 ASSESSMENT — ENCOUNTER SYMPTOMS
PERSON REPORTING PAIN: PATIENT
SUBJECTIVE PAIN PROGRESSION: WAXING AND WANING
PAIN: 1
PAIN LOCATION - PAIN SEVERITY: 5/10
PAIN LOCATION: RIGHT HIP
PAIN LOCATION - PAIN QUALITY: ACHING

## 2024-06-25 ENCOUNTER — HOME CARE VISIT (OUTPATIENT)
Dept: HOME HEALTH SERVICES | Facility: HOME HEALTH | Age: 74
End: 2024-06-25
Payer: MEDICARE

## 2024-06-25 VITALS — RESPIRATION RATE: 16 BRPM | OXYGEN SATURATION: 95 % | HEART RATE: 83 BPM

## 2024-06-25 PROCEDURE — G0151 HHCP-SERV OF PT,EA 15 MIN: HCPCS | Mod: HHH

## 2024-06-25 SDOH — HEALTH STABILITY: PHYSICAL HEALTH: PHYSICAL EXERCISE: 15

## 2024-06-25 SDOH — HEALTH STABILITY: PHYSICAL HEALTH: EXERCISE TYPE: THA

## 2024-06-25 SDOH — HEALTH STABILITY: PHYSICAL HEALTH: EXERCISE ACTIVITY: OPEN/ CLOSED CHAIN

## 2024-06-25 SDOH — HEALTH STABILITY: PHYSICAL HEALTH: PHYSICAL EXERCISE: SIT, SUPINE, STAND

## 2024-06-25 ASSESSMENT — ENCOUNTER SYMPTOMS
PAIN LOCATION - PAIN QUALITY: ACHE
PAIN LOCATION: RIGHT HIP
MUSCLE WEAKNESS: 1
LIMITED RANGE OF MOTION: 1
PAIN: 1
PERSON REPORTING PAIN: PATIENT
PAIN LOCATION - PAIN SEVERITY: 5/10

## 2024-06-25 ASSESSMENT — ACTIVITIES OF DAILY LIVING (ADL)
PHYSICAL TRANSFERS ASSESSED: 1
CURRENT_FUNCTION: SUPERVISION
AMBULATION_DISTANCE/DURATION_TOLERATED: 50 FT
AMBULATION ASSISTANCE ON FLAT SURFACES: 1

## 2024-06-27 ENCOUNTER — HOME CARE VISIT (OUTPATIENT)
Dept: HOME HEALTH SERVICES | Facility: HOME HEALTH | Age: 74
End: 2024-06-27
Payer: MEDICARE

## 2024-06-27 VITALS — TEMPERATURE: 98.8 F | RESPIRATION RATE: 14 BRPM

## 2024-06-27 PROCEDURE — G0151 HHCP-SERV OF PT,EA 15 MIN: HCPCS | Mod: HHH

## 2024-06-27 SDOH — HEALTH STABILITY: PHYSICAL HEALTH: PHYSICAL EXERCISE: SIT, SUPINE, STAND

## 2024-06-27 SDOH — HEALTH STABILITY: PHYSICAL HEALTH: EXERCISE TYPE: THA

## 2024-06-27 SDOH — HEALTH STABILITY: PHYSICAL HEALTH: EXERCISE ACTIVITIES SETS: 2

## 2024-06-27 SDOH — HEALTH STABILITY: PHYSICAL HEALTH: EXERCISE ACTIVITY: OPEN/ CLOSED CHAIN

## 2024-06-27 SDOH — HEALTH STABILITY: PHYSICAL HEALTH: PHYSICAL EXERCISE: 10

## 2024-06-27 ASSESSMENT — ENCOUNTER SYMPTOMS
LIMITED RANGE OF MOTION: 1
LOWEST PAIN SEVERITY IN PAST 24 HOURS: 1/10
PAIN: 1
PAIN LOCATION - PAIN DURATION: HR
SUBJECTIVE PAIN PROGRESSION: GRADUALLY IMPROVING
PAIN LOCATION - PAIN FREQUENCY: INTERMITTENT
PAIN LOCATION - PAIN SEVERITY: 4/10
PAIN LOCATION - RELIEVING FACTORS: MEDS
PAIN LOCATION - PAIN QUALITY: ACHE
MUSCLE WEAKNESS: 1
PAIN LOCATION: RIGHT KNEE
HIGHEST PAIN SEVERITY IN PAST 24 HOURS: 4/10
PERSON REPORTING PAIN: PATIENT
PAIN LOCATION - EXACERBATING FACTORS: ROM

## 2024-06-27 ASSESSMENT — ACTIVITIES OF DAILY LIVING (ADL)
CURRENT_FUNCTION: SUPERVISION
AMBULATION ASSISTANCE ON FLAT SURFACES: 1
HOME_HEALTH_OASIS: 01
AMBULATION_DISTANCE/DURATION_TOLERATED: 100 FT
OASIS_M1830: 03
PHYSICAL TRANSFERS ASSESSED: 1

## 2024-07-18 ENCOUNTER — OFFICE VISIT (OUTPATIENT)
Dept: ORTHOPEDIC SURGERY | Facility: CLINIC | Age: 74
End: 2024-07-18
Payer: MEDICARE

## 2024-07-18 ENCOUNTER — HOSPITAL ENCOUNTER (OUTPATIENT)
Dept: RADIOLOGY | Facility: CLINIC | Age: 74
Discharge: HOME | End: 2024-07-18
Payer: MEDICARE

## 2024-07-18 DIAGNOSIS — Z47.1 AFTERCARE FOLLOWING RIGHT HIP JOINT REPLACEMENT SURGERY: ICD-10-CM

## 2024-07-18 DIAGNOSIS — G57.01 SCIATIC NERVE PALSY, RIGHT: Primary | ICD-10-CM

## 2024-07-18 DIAGNOSIS — Z96.641 AFTERCARE FOLLOWING RIGHT HIP JOINT REPLACEMENT SURGERY: ICD-10-CM

## 2024-07-18 PROCEDURE — 99211 OFF/OP EST MAY X REQ PHY/QHP: CPT | Performed by: PHYSICIAN ASSISTANT

## 2024-07-18 PROCEDURE — 73502 X-RAY EXAM HIP UNI 2-3 VIEWS: CPT | Mod: RT

## 2024-07-18 PROCEDURE — 1159F MED LIST DOCD IN RCRD: CPT | Performed by: PHYSICIAN ASSISTANT

## 2024-07-18 PROCEDURE — 1125F AMNT PAIN NOTED PAIN PRSNT: CPT | Performed by: PHYSICIAN ASSISTANT

## 2024-07-18 PROCEDURE — 73502 X-RAY EXAM HIP UNI 2-3 VIEWS: CPT | Mod: RIGHT SIDE | Performed by: RADIOLOGY

## 2024-07-18 PROCEDURE — 1157F ADVNC CARE PLAN IN RCRD: CPT | Performed by: PHYSICIAN ASSISTANT

## 2024-07-18 ASSESSMENT — ENCOUNTER SYMPTOMS
BACK PAIN: 0
VOMITING: 0
WHEEZING: 0
DYSURIA: 0
COLOR CHANGE: 0
RESPIRATORY NEGATIVE: 1
ABDOMINAL PAIN: 0
ENDOCRINE NEGATIVE: 1
FATIGUE: 0
ACTIVITY CHANGE: 0
AGITATION: 0
ARTHRALGIAS: 0
PALPITATIONS: 0
EYES NEGATIVE: 1
FREQUENCY: 0
FEVER: 0
CHILLS: 0
CONFUSION: 0
CARDIOVASCULAR NEGATIVE: 1
SHORTNESS OF BREATH: 0
DIZZINESS: 0
WOUND: 0
CHEST TIGHTNESS: 0
CONSTITUTIONAL NEGATIVE: 1
HEMATURIA: 0
JOINT SWELLING: 1
HEMATOLOGIC/LYMPHATIC NEGATIVE: 1
WEAKNESS: 0
NERVOUS/ANXIOUS: 0
ALLERGIC/IMMUNOLOGIC NEGATIVE: 1
BLOOD IN STOOL: 0
NAUSEA: 0
DIFFICULTY URINATING: 0
LIGHT-HEADEDNESS: 0
COUGH: 0

## 2024-07-18 ASSESSMENT — PAIN - FUNCTIONAL ASSESSMENT: PAIN_FUNCTIONAL_ASSESSMENT: 0-10

## 2024-07-18 ASSESSMENT — PAIN DESCRIPTION - DESCRIPTORS: DESCRIPTORS: ACHING

## 2024-07-18 ASSESSMENT — PAIN SCALES - GENERAL: PAINLEVEL_OUTOF10: 3

## 2024-07-18 NOTE — PROGRESS NOTES
CHRISTI Merchant, PABlaneC, ATC  Adult Reconstruction and Joint Replacement Surgery  Phone: [phone]     Fax:[phone]            Chief Complaint   Patient presents with    Right Hip - Post-op       HPI:    Nancy Nuñez is a pleasant 74 y.o. year-old female here for follow-up of their side: right total hip arthroplasty by Dr. Rodriguez.  The patient is approximately 6 week(s) postop.The patient has no mechanical symptoms.  The patient has mild swelling and pain.   The patients wound has healed uneventfully.However, she has had difficulty with dorsiflexion since surgery and unable to lift her foot to walk. She denies any numbness or paresthesias distally. She is doing home program and using OTC AFO secondary to foot drop. She will require custom AFO.    Review of Systems   Constitutional: Negative.  Negative for activity change, chills, fatigue and fever.   HENT: Negative.     Eyes: Negative.    Respiratory: Negative.  Negative for cough, chest tightness, shortness of breath and wheezing.    Cardiovascular: Negative.  Negative for palpitations.   Gastrointestinal:  Negative for abdominal pain, blood in stool, nausea and vomiting.   Endocrine: Negative.  Negative for cold intolerance and polyuria.   Genitourinary: Negative.  Negative for difficulty urinating, dysuria, frequency, hematuria and urgency.   Musculoskeletal:  Positive for gait problem and joint swelling. Negative for arthralgias and back pain.   Skin: Negative.  Negative for color change, pallor, rash and wound.   Allergic/Immunologic: Negative.  Negative for environmental allergies.   Neurological:  Negative for dizziness, weakness and light-headedness.   Hematological: Negative.    Psychiatric/Behavioral:  Negative for agitation, confusion and suicidal ideas. The patient is not nervous/anxious.    All other systems reviewed and are negative.    Past Medical History:   Diagnosis Date    Anxiety and depression     Inpatient stays x 2 in  2010    Cataract     Bilaterally    H. pylori infection     2024 per EGD    HLD (hyperlipidemia)     HTN (hypertension)     Hyponatremia     Chronic Low normal - low    Hypothyroidism     due to Hashimoto's thyroiditis clinically and biochemically euthyroid on replacement    Osteoarthritis     Osteoporosis     Posterior vitreous detachment of right eye      Patient Active Problem List   Diagnosis    Unilateral primary osteoarthritis, right hip    Chronic idiopathic constipation    Primary osteoarthritis of right hip    Hip arthritis     Medication Documentation Review Audit       Reviewed by Mallory Thomas LPN (Licensed Nurse) on 24 at 1442      Medication Order Taking? Sig Documenting Provider Last Dose Status   alendronate (Fosamax) 70 mg tablet [id number] Yes Take 1 tablet (70 mg) by mouth every 7 days. Historical Provider, MD Taking Active   amLODIPine (Norvasc) 2.5 mg tablet [id number] Yes Take 1 tablet (2.5 mg) by mouth once daily. Take BP prior to resuming medication. Take medication if BP is > or = 130/90. Zina Ray PA-C Taking Active   escitalopram (Lexapro) 10 mg tablet [id number] Yes Take 2 tablets (20 mg) by mouth once daily at bedtime. Historical Provider, MD Taking Active   levothyroxine (Synthroid, Levoxyl) 112 mcg tablet [id number] Yes Take 1 tablet (112 mcg) by mouth once daily. X 6 days, not on Sundays Historical Provider, MD Taking Active   LORazepam (Ativan) 0.5 mg tablet [id number] Yes Take 1 tablet (0.5 mg) by mouth every 6 hours if needed for anxiety. Historical Provider, MD Taking Active   pantoprazole (ProtoNix) 40 mg EC tablet [id number]  Take 1 tablet (40 mg) by mouth once daily. Do not crush, chew, or split. Madan Avila MD   [id number] [phone]   simvastatin (Zocor) 10 mg tablet [id number] Yes Take 1 tablet (10 mg) by mouth once daily at bedtime. Historical Provider, MD Taking Active                  Allergies   Allergen Reactions    Cat Dander Runny nose    Dog Dander Runny nose     House Dust Runny nose    Mold Runny nose     Social History     Socioeconomic History    Marital status:      Spouse name: Not on file    Number of children: Not on file    Years of education: Not on file    Highest education level: Not on file   Occupational History    Not on file   Tobacco Use    Smoking status: Former     Types: Cigarettes     Passive exposure: Past    Smokeless tobacco: Never   Substance and Sexual Activity    Alcohol use: Not on file    Drug use: Not on file    Sexual activity: Not on file   Other Topics Concern    Not on file   Social History Narrative    Not on file     Social Determinants of Health     Financial Resource Strain: Low Risk  (6/5/2024)    Overall Financial Resource Strain (CARDIA)     Difficulty of Paying Living Expenses: Not hard at all   Food Insecurity: No Food Insecurity (6/5/2024)    Hunger Vital Sign     Worried About Running Out of Food in the Last Year: Never true     Ran Out of Food in the Last Year: Never true   Transportation Needs: No Transportation Needs (6/27/2024)    OASIS : Transportation     Lack of Transportation (Medical): No     Lack of Transportation (Non-Medical): No     Patient Unable or Declines to Respond: No   Physical Activity: Inactive (5/2/2024)    [facility]    Exercise Vital Sign     Days of Exercise per Week: 0 days     Minutes of Exercise per Session: 0 min   Stress: Stress Concern Present (6/5/2024)    [employer] - Occupational Stress Questionnaire     Feeling of Stress : To some extent   Social Connections: Feeling Socially Integrated (6/27/2024)    OASIS : Social Isolation     Frequency of experiencing loneliness or isolation: Never   Recent Concern: Social Connections - Moderately Isolated (6/5/2024)    Social Connection and Isolation Panel [NHANES]     Frequency of Communication with Friends and Family: More than three times a week     Frequency of  Social Gatherings with Friends and Family: More than three times a week     Attends Orthodox Services: Never     Active Member of Clubs or Organizations: No     Attends Club or Organization Meetings: Never     Marital Status:    Intimate Partner Violence: Not At Risk (6/5/2024)    Humiliation, Afraid, Rape, and Kick questionnaire     Fear of Current or Ex-Partner: No     Emotionally Abused: No     Physically Abused: No     Sexually Abused: No   Housing Stability: Low Risk  (6/5/2024)    Housing Stability Vital Sign     Unable to Pay for Housing in the Last Year: No     Number of Places Lived in the Last Year: 1     Unstable Housing in the Last Year: No     Past Surgical History:   Procedure Laterality Date    COLONOSCOPY      Internal hemorrhoids, diverticulosis, polyps    ESOPHAGOSCOPY / EGD      HYSTEROSCOPY  09/22/2006    RHINOPLASTY      TONSILLECTOMY      TOTAL HIP ARTHROPLASTY Bilateral 06/05/2024    Left 4/20/2022, Right 6/5/2024       Physical Exam  side: right Hip  There were no vitals filed for this visit.  AxO x 3 in NAD, appears stated age. Cooperative. Anxious  Assistive Device: wheelchair.   Skin inact over bilateral upper and lower extremities, no erythema, ecchymosis, temperature changes. No popliteal lymphadenopathy,  No other overlying lesion  mood: anxious, depressed  Respirations non labored  Surgical hip demonstrates pain free ROM with mild tenderness to palpation over greater trochanter laterally.  The incision is midline healing well with no signs of surrounding infection, dehiscence or drainage.   Neuro  3/5 hip flexion/knee extension  0/5DF  2/5PF  0/5EHL  SILT in mathew/saph/ per/tib distribution   Extremities warm and well perfused.  No lower extremity calf tenderness  1+ ankle edema  2+ Femoral/DP/PT pulses bilaterally    IMAGING:  No images are attached to the encounter.    I personally reviewed multiple views of the hip today in clinic.  Status post side: right Total Hip  Arthroplasty. The implant is well fixed, well aligned.  No evidence of wilmer-implant fracture, lucency or dislocation. Leg lengths closely restored.    Impression/Plan:    Nancy Nuñez is doing well post-operatively but has sustained right foot drop  S/P Total side: right Hip  Arthroplasty/Sciatic nerve palsy  She does have post-operative sciatic nerve pasly on clinical exam requiring AFO. Rx given to patient. We discussed the nature of the nerve injury after a total hip arthroplasty and that this could be a stretch to the sciatic nerve. This can take a long time to heal and return to full function would vary. I would like to see her back in 3 months for re-evaluation. She can start outpatient PT with aggressive use of NMES. We discussed no driving given her foot drop on the right side until she resumes full function of her foot. She may require EMG/NCT at some point.    I talked with patient at length about activity precautions and dislocation precautions in detail. I talked with patient at length about activity precautions and dislocation precautions in detail which include avoidance of hip flexion > 90 degrees with simultaneous internal rotation.  Patient can progress activities as tolerated. And understands their permanent precautions. At this time, you may gradually increase your activities and get back to a normal, low-impact lifestyle. Please avoid running, jumping, and heavy lifting.      This was  demonstrated in the room by LUIS and patient verbalized understanding. Patient should also limit running, jumping, weight lifting and repetitive activity. The patient verbalizes understanding of these permanent precautions.        2. Continue HEP or outpatient PT, per protocol.    3. Continue Post-operative instructions.    4. Discussed the importance of dental prophylactic dental antibiotics lifelong. Patient [facility]       [facility] or surgeons office.    All questions were  answered.    Follow-up 3 months with xrays at next visit.      Candace Mc MPAS, PA-C, ATC  Orthopedic Physician Assisant  Adult Reconstruction and Total Joint Replacement  General Orthopedics  Department of Orthopaedic Surgery  [place] [id number]  [employer] messaging preferred

## 2024-08-21 ENCOUNTER — EVALUATION (OUTPATIENT)
Dept: PHYSICAL THERAPY | Facility: CLINIC | Age: 74
End: 2024-08-21
Payer: MEDICARE

## 2024-08-21 DIAGNOSIS — G57.01 SCIATIC NERVE PALSY, RIGHT: ICD-10-CM

## 2024-08-21 PROCEDURE — 97110 THERAPEUTIC EXERCISES: CPT | Mod: GP | Performed by: PHYSICAL THERAPIST

## 2024-08-21 PROCEDURE — 97162 PT EVAL MOD COMPLEX 30 MIN: CPT | Mod: GP | Performed by: PHYSICAL THERAPIST

## 2024-08-21 ASSESSMENT — ENCOUNTER SYMPTOMS
OCCASIONAL FEELINGS OF UNSTEADINESS: 0
LOSS OF SENSATION IN FEET: 1
DEPRESSION: 1

## 2024-08-21 NOTE — PROGRESS NOTES
Physical Therapy    Physical Therapy Evaluation and Treatment      Patient Name: Nancy Nuñez  MRN: [id number]  Today's Date: 8/21/2024  Visit: 1  Insurance: Reviewed  Physician: Candace ETIENNE  Problem List Items Addressed This Visit    None  Visit Diagnoses         Codes    Sciatic nerve palsy, right     G57.01    Relevant Orders    Follow Up In Physical Therapy          Time Entry:   Time Calculation  Start Time: 0145  Stop Time: 0240  Time Calculation (min): 55 min  PT Evaluation Time Entry  PT Evaluation (Moderate) Time Entry: 45  PT Therapeutic Procedures Time Entry  Therapeutic Exercise Time Entry: 10                   Assessment:  Patient seen in PT for Initial Evaluation for sciatic nerve palsy, right.   Patient presents with postural deviation  decreased hip ROM , decreased hip strength, hip tenderness, drop foot left, decreased ankle strength, decreased balance, GT/stair training.  Functionally, patient  unable to dress, put on shoes, needs help with sit to stand, showering, limited walking to 1-2 minutes and 4 falls in the last 2 months.  Patient rates LEFS at 20%.  Advised patient's  to follow up with PCP regarding recent episodes of syncope with falls.     PT Assessment  Rehab Prognosis: Good  Evaluation/Treatment Tolerance: Patient tolerated treatment well     Plan:  Continue with POC  CHIVO protocol, ankle strength, calf stretches, balance, GT, stair training, LE strength, PRE's, CKC, instruction in gait with AFO  OP PT Plan  PT Plan: Skilled PT  PT Frequency: 2 times per week  Duration: 8  Onset Date: 07/16/24  Certification Period Start Date: 08/21/24  Certification Period End Date: 11/19/24  Rehab Potential: Good  Plan of Care Agreement: Patient    Current Problem:   1. Sciatic nerve palsy, right  Referral to Physical Therapy    Follow Up In Physical Therapy          Subjective    General:  Patient with a history of right hip pain for 2 years - for which patient had CHIVO - 6/5/24- right foot  drop after.wards  Onset was after surgery.  Patient treated with PT, brace .  Patient complains of pain in the region of the right lateral thigh to ant knee to medial shin, ache pain, 8/10 at worst last 7 days.  Some pain in the region of the coccyx with sitting.  Patient's pain is constant.  Tight in the am.  Functionally, patient is unable to dress, put on shoes, needs help with sit to stand, showering, limited walking to 1-2 minutes, and 4 falls in the last month.  History of syncope - BP change with change of position.         Precautions: fall risk, CHIVO precautions        Prior Level of Function: unable to sleep through night secondary to hip pain       Objective     Posture: wearing AFO on right - leans weight to right   LE ROM: WFL except 0 degrees DF at the right ankle  Left LE strength: 4+/5 hip abd, 4+/5 hip add, 4+/5 hip flexion, 4+/5 hip extension, 5/5 knee extension, 5/5 knee flexion, 5/5 dorsiflexion, 5/5 plantarflexion   Right LE strength: 4/5 hip abd, 4/5 hip add, 4/5 hip flexion, 4/5 hip extension,  4/5 knee extension, 4/5 knee flexion, 0/5 dorsiflexion, 5/5 plantarflexion   Gait: decreased heel strike bilaterally with increased right hip flexion to advance right leg - slight drag on bilateral feet at times  Balance: 5 sec SLS on left, unable to do sls on right   Steps one at a time with close supervision    Outcome Measures:  Other Measures  Lower Extremity Funtional Score (LEFS): 16     Treatments:  Patient instructed in HEP inccluding: ball squeezes, hip abd/er with green theraband, , bridges and PT 10-20X each (10 minutes)    EDUCATION: HEP       Goals:  Active       PT Problem       PT Goal 1       Start:  08/21/24    Expected End:  11/19/24       Right leg pain 5/10 or less         PT Goal 2       Start:  08/21/24    Expected End:  11/19/24       Improve lefs by 20%         PT Goal 3       Start:  08/21/24    Expected End:  11/19/24       SLS 3-5 seconds on right, 10 seconds on left         PT  Goal 4       Start:  08/21/24    Expected End:  11/19/24       5/5 hip strength          PT Goal 5       Start:  08/21/24    Expected End:  11/19/24       Normalize gait with appropriate assistive device  Able to do steps reciprocally

## 2024-08-23 DIAGNOSIS — Z96.641 S/P TOTAL RIGHT HIP ARTHROPLASTY: Primary | ICD-10-CM

## 2024-08-23 RX ORDER — AMOXICILLIN 500 MG/1
CAPSULE ORAL
Qty: 4 CAPSULE | Refills: 6 | Status: SHIPPED | OUTPATIENT
Start: 2024-08-23

## 2024-08-29 ENCOUNTER — TREATMENT (OUTPATIENT)
Dept: PHYSICAL THERAPY | Facility: CLINIC | Age: 74
End: 2024-08-29
Payer: MEDICARE

## 2024-08-29 DIAGNOSIS — G57.01 SCIATIC NERVE PALSY, RIGHT: ICD-10-CM

## 2024-08-29 PROCEDURE — 97110 THERAPEUTIC EXERCISES: CPT | Mod: GP | Performed by: PHYSICAL THERAPIST

## 2024-08-29 NOTE — PROGRESS NOTES
"Physical Therapy    Physical Therapy Treatment    Patient Name: Nancy Nuñez  MRN: [id number]  Today's Date: 8/29/2024  Visit: 2/16  Insurance: [employer]  Authorization: 8/21/24-11/19/24  Time Entry:   Time Calculation  Start Time: 0510  Stop Time: 0550  Time Calculation (min): 40 min     PT Therapeutic Procedures Time Entry  Therapeutic Exercise Time Entry: 40      Assessment: Good understanding and compliance with HEP.       Plan:  Continue with POC       Current Problem  1. Sciatic nerve palsy, right  Follow Up In Physical Therapy          General     Occasionally stomach discomfort, and  lightheadedness  Exercises once a day  No right leg pain today       Subjective    Precautions: fall risk,CHIVO precautions     Vital Signs     Pain     Objective     Independent with HEP    Treatments:  SciFit stepper 7 minutes   Step stretch 20X   Standing hip ROM with 1# ankle weight 15X each  Practiced heel to toe walking in parallel bars 5X B + F   Practiced heel to toe walking in parallel bars with left hand hold only 5X B + 4  4\" step up 10X each  Reviewed HEP  Standing weight shift on foam in parallel bars 20X  (40 minutes)      OP EDUCATION: HEP       Goals:  Active       PT Problem       PT Goal 1       Start:  08/21/24    Expected End:  11/19/24       Right leg pain 5/10 or less         PT Goal 2       Start:  08/21/24    Expected End:  11/19/24       Improve lefs by 20%         PT Goal 3       Start:  08/21/24    Expected End:  11/19/24       SLS 3-5 seconds on right, 10 seconds on left         PT Goal 4       Start:  08/21/24    Expected End:  11/19/24       5/5 hip strength          PT Goal 5       Start:  08/21/24    Expected End:  11/19/24       Normalize gait with appropriate assistive device  Able to do steps reciprocally           "

## 2024-09-04 ENCOUNTER — TREATMENT (OUTPATIENT)
Dept: PHYSICAL THERAPY | Facility: CLINIC | Age: 74
End: 2024-09-04
Payer: MEDICARE

## 2024-09-04 DIAGNOSIS — G57.01 SCIATIC NERVE PALSY, RIGHT: Primary | ICD-10-CM

## 2024-09-04 PROCEDURE — 97110 THERAPEUTIC EXERCISES: CPT | Mod: GP

## 2024-09-04 PROCEDURE — 97116 GAIT TRAINING THERAPY: CPT | Mod: GP

## 2024-09-04 NOTE — PROGRESS NOTES
"Physical Therapy    Physical Therapy Treatment    Patient Name: Nancy Nuñez  MRN: [id number]  Today's Date: 9/4/2024  Visit: 3/16  Insurance: [employer]  Authorization: 8/21/24-11/19/24  Time Entry:   Time Calculation  Start Time: 1118  Stop Time: 1203  Time Calculation (min): 45 min     PT Therapeutic Procedures Time Entry  Therapeutic Exercise Time Entry: 28  Gait Training Time Entry: 15      Assessment: Patient tolerated all therapeutic activities well with improved R hip discomfort and no pain at end of session. Added forward and lateral step ups at 6\" step with HR support and cues for ant wt shifting. Progressed 3-way hip to 2# ankle weight for functional hip strengthening. Initiated gait training with CF AFO as pt ambulated into clinic demonstrating R toe drag as noted in objective section. With education and cues to perform heel strike at initial contact to allow for GRF and kinetic energy of AFO to clear R toes during toe-off and throughout swing phase. Patient ambulated with good return demonstration of heel strike with RW and CF AFO requiring min cues. Trial of posterior CF AFO to assess gait mechanics, no major changes reported in terms of comfort and no major gait  changes.     Plan: Continue with POC. Contact Daily re ant CG AFO as pt with c/o slight discomfort at ankle.       Current Problem  1. Sciatic nerve palsy, right  Follow Up In Physical Therapy          General   Patient states she is complaint with home program performing 1x/day every day. She reported slight R hip discomfort which improved with warm-up. Per  she cont to c/o R ankle swelling and pain in R foot/ankle. He [facility]      Subjective    Precautions: fall risk,CHIVO precautions       Pain   0/10 R hip pain    Objective     Gait Analysis: amb with RW and R Ant CF AFO with inversion and foot flat at initial toe drag R LE  - toe drag improved with " "cues for heel strike at initial contact to allow for kinetic energy/GRF to provide DF assist, inversion remained    Treatments:  Therapeutic exercise x 28 min  SciFit stepper x 6 minutes for Hip ROM and warm-up  Step stretch x 20 R  Forward 6\" step up with BL HR x 10 each  Lateral step up with BL HR x 10 each  Standing 3-way hip with 2# ankle weight x 10 ea    Gait Training x 15 min  Education on AFO purpose for foot drop and heel strike to allow for toe off  Ambulation with RW and ant CF AFO with cues for heel to toe pattern on R 4 x 30 feet  Ambulation with RW and post CF AFO with cues for heel to toe pattern on R 4 x 30 feet   *Patient reported both AFO's feeling the same during ambulation, she did not have R knee buckling with trial of post CF AFO      OP EDUCATION: HEP       Goals:  Active       PT Problem       PT Goal 1       Start:  08/21/24    Expected End:  11/19/24       Right leg pain 5/10 or less         PT Goal 2       Start:  08/21/24    Expected End:  11/19/24       Improve lefs by 20%         PT Goal 3       Start:  08/21/24    Expected End:  11/19/24       SLS 3-5 seconds on right, 10 seconds on left         PT Goal 4       Start:  08/21/24    Expected End:  11/19/24       5/5 hip strength          PT Goal 5       Start:  08/21/24    Expected End:  11/19/24       Normalize gait with appropriate assistive device  Able to do steps reciprocally           " 5

## 2024-09-10 ENCOUNTER — TREATMENT (OUTPATIENT)
Dept: PHYSICAL THERAPY | Facility: CLINIC | Age: 74
End: 2024-09-10
Payer: MEDICARE

## 2024-09-10 DIAGNOSIS — G57.01 SCIATIC NERVE PALSY, RIGHT: ICD-10-CM

## 2024-09-10 PROCEDURE — 97116 GAIT TRAINING THERAPY: CPT | Mod: GP | Performed by: PHYSICAL THERAPIST

## 2024-09-10 PROCEDURE — 97110 THERAPEUTIC EXERCISES: CPT | Mod: GP | Performed by: PHYSICAL THERAPIST

## 2024-09-10 NOTE — PROGRESS NOTES
"Physical Therapy    Physical Therapy Treatment    Patient Name: Nancy Nuñez  MRN: [id number]  Today's Date: 9/10/2024  Visit: 3/16  Insurance: [employer]  Authorization: 8/21/24-11/19/24  Time Entry:   Time Calculation  Start Time: 0320  Stop Time: 0400  Time Calculation (min): 40 min     PT Therapeutic Procedures Time Entry  Therapeutic Exercise Time Entry: 30  Gait Training Time Entry: 10      Assessment: Patient with improved balance today.  Able to walk and side step in parallel bars without holding onto to railings.  Instructed in gait with cane.  Patient's  encouraged to get a gait belt and practice walking with patient with cane at home.  Instructed in proper spotting technique.     Plan: Continue with POC.        Current Problem  1. Sciatic nerve palsy, right  Follow Up In Physical Therapy          General  Patient states no hip pain  Walking with rolling walker -  provides supervision most of the time when patient is walking at home.    No recent falls      Subjective    Precautions: fall risk,CHIVO precautions       Pain   0/10 R hip pain    Objective         Treatments:  Therapeutic exercise x 30 min  SciFit stepper x 7 minutes for Hip ROM and warm-up  Step stretch x 20 R  4\" steps in parallel bars reciprocal X 3  Forward walk in parallel bars without holding on 10' X 6  Side to side walking in parallel bars 10' X 6  FWD step then backward step without holding on 10X each    Gait Training x 10 min  Education on gait with cane in left hand.  Patient am,      OP EDUCATION: HEP       Goals:  Active       PT Problem       PT Goal 1       Start:  08/21/24    Expected End:  11/19/24       Right leg pain 5/10 or less         PT Goal 2       Start:  08/21/24    Expected End:  11/19/24       Improve lefs by 20%         PT Goal 3       Start:  08/21/24    Expected End:  11/19/24       SLS 3-5 seconds on right, 10 seconds on left         PT Goal 4       Start:  08/21/24    Expected End:  11/19/24    "    5/5 hip strength          PT Goal 5       Start:  08/21/24    Expected End:  11/19/24       Normalize gait with appropriate assistive device  Able to do steps reciprocally

## 2024-09-12 ENCOUNTER — TREATMENT (OUTPATIENT)
Dept: PHYSICAL THERAPY | Facility: CLINIC | Age: 74
End: 2024-09-12
Payer: MEDICARE

## 2024-09-12 DIAGNOSIS — G57.01 SCIATIC NERVE PALSY, RIGHT: ICD-10-CM

## 2024-09-12 PROCEDURE — 97116 GAIT TRAINING THERAPY: CPT | Mod: GP

## 2024-09-12 PROCEDURE — 97110 THERAPEUTIC EXERCISES: CPT | Mod: GP

## 2024-09-12 NOTE — PROGRESS NOTES
Physical Therapy    Physical Therapy Treatment    Patient Name: Nancy Nuñez  MRN: [id number]  Today's Date: 9/12/2024  Visit: 5/16  Insurance: [employer]  Authorization: 8/21/24-11/19/24  Time Entry:   Time Calculation  Start Time: 1120  Stop Time: 1203  Time Calculation (min): 43 min     PT Therapeutic Procedures Time Entry  Therapeutic Exercise Time Entry: 18  Gait Training Time Entry: 23      Assessment: Initiated ambulation with SPC outdoor along uneven surfaces as well as curb negotiation, patient with good return demonstration of sequencing and improving balance/safety with ambulation. Encouraged patient and  to walk with SPC in home and along driveway with use of gait belt,  to purchase online. Continued to encourage performing toe raises in seated to allow for DF mm facilitation/activation to maintain strength. Added lateral, forward, and backward resistive monster walks for functional hip strengthening and balance.     Plan: Continue with POC.        Current Problem  1. Sciatic nerve palsy, right  Follow Up In Physical Therapy          Subjective    Patient states she is well.  states that she had an episode of fainting yesterday with sit to stand transition, noting that she has had a drop of 40 in BP and will be sing doctor regarding this next week. Patient and  were educated re orthostatic hypotension and methods to assist with lightheadedness and BP change such as compression stocking, hydration, and mm pumping ex, they both expressed understanding.     Precautions: fall risk,CHIVO precautions       Pain   0/10 R hip pain    Objective       Treatments:  Therapeutic exercise x 18 min  SciFit bike x 8 minutes for Hip ROM and warm-up  Lateral monster walks with red TB at ankles 2 x 10' d/b  Forward and backward monster walks with red TB at ankles 2 x 10' ea  Educated patient on orthostatic hypotension and method to improve blood flow such as heel-toe raises and marches; bending  down and raising slowly; use of compression socks; hydration  Verbally reviewed current home program and added monster walks with red TB at ankles for HEP    Gait Training x 23 min  Ambulation with SPC patient demonstrating good step through gait pattern and sequencing x 250 feet  Outdoor ambulation with SPC at A over uneven sidewalk and pavement     Curb negotiation with SPC education and patient return demonstration x 4 reps at [facility] to A  Discussed ambulation with SPC in house and outdoor up/down driveway with  and possible gait belt for safety      OP EDUCATION: HEP  Access Code: [email or web address]  URL: [email or web address]  Date: 09/12/2024  Prepared by: Armando Quintanilla    Exercises  - Side Stepping with Resistance at Ankles and Counter Support  - 1 x daily - 7 x weekly - 3 sets - 10 reps  - Forward and Backward Monster Walk with Resistance at Ankles and Counter Support  - 1 x daily - 7 x weekly - 3 sets - 10 reps    Goals:  Active       PT Problem       PT Goal 1       Start:  08/21/24    Expected End:  11/19/24       Right leg pain 5/10 or less         PT Goal 2       Start:  08/21/24    Expected End:  11/19/24       Improve lefs by 20%         PT Goal 3       Start:  08/21/24    Expected End:  11/19/24       SLS 3-5 seconds on right, 10 seconds on left         PT Goal 4       Start:  08/21/24    Expected End:  11/19/24       5/5 hip strength          PT Goal 5       Start:  08/21/24    Expected End:  11/19/24       Normalize gait with appropriate assistive device  Able to do steps reciprocally

## 2024-09-16 ENCOUNTER — TREATMENT (OUTPATIENT)
Dept: PHYSICAL THERAPY | Facility: CLINIC | Age: 74
End: 2024-09-16
Payer: MEDICARE

## 2024-09-16 DIAGNOSIS — G57.01 SCIATIC NERVE PALSY, RIGHT: ICD-10-CM

## 2024-09-16 PROCEDURE — 97112 NEUROMUSCULAR REEDUCATION: CPT | Mod: GP

## 2024-09-16 PROCEDURE — 97110 THERAPEUTIC EXERCISES: CPT | Mod: GP

## 2024-09-16 NOTE — PROGRESS NOTES
Physical Therapy    Physical Therapy Treatment    Patient Name: Nancy Nuñez  MRN: [id number]  Today's Date: 9/16/2024  Time Entry:   Time Calculation  Start Time: 1119  Stop Time: 1204  Time Calculation (min): 45 min     PT Therapeutic Procedures Time Entry  Neuromuscular Re-Education Time Entry: 27  Therapeutic Exercise Time Entry: 18    Visit: 6/16  Insurance: [facility] Medicare  Authorization: 8/21/24-11/19/24    Assessment:   Patient tolerated all therapeutic activities well without adverse effect or pain. Patient performed scavenger hunt around clinic with SPC demonstrating good sequencing and heel-toe pattern norman on R LE. She did well with reciprocal forward pily negotiation with heel strike at initial contact. Patient would benefit from dynamic SLS stability exercises as she demonstrated difficulty with SLS up to 5 sec holds.       Plan: Continue with POC.        Current Problem  1. Sciatic nerve palsy, right  Follow Up In Physical Therapy          Subjective    Patient states she is well but reports a bit of lightheadedness this morning.    Precautions: fall risk,CHIVO precautions       Pain   0/10 R hip pain    Objective     Treatments:  Therapeutic exercise x 18 min  SciFit bike lvl 2.6 x 9 minutes for Hip ROM and warm-up  Step stretch x 10   Forward step ups with 1 HR 2 x 10 R  Lateral step ups with BL UE x 10 R    Neuromuscular Re-education x 27 min  Scavenger hunt for 7 cones with SPC at supervision x 150 feet with cognitive dual task of naming objects in grocery store  Forward Pily negotiation x 3 passes d/b in // bars with UL UE  Lateral Pily negotiation x 3 passes d/b in // bars with UL UE  SLS 4 x 5 sec holds R in // bars  Mini squat on airex 2 x 10      OP EDUCATION: HEP  Access Code: [email or web address]  URL: [email or web address]  Date: 09/12/2024  Prepared by: Armando Quintanilla    Exercises  - Side Stepping with Resistance at Ankles and Counter Support  - 1 x daily - 7 x weekly - 3 sets - 10 reps  -  Forward and Backward Monster Walk with Resistance at Ankles and Counter Support  - 1 x daily - 7 x weekly - 3 sets - 10 reps    Goals:  Active       PT Problem       PT Goal 1       Start:  08/21/24    Expected End:  11/19/24       Right leg pain 5/10 or less         PT Goal 2       Start:  08/21/24    Expected End:  11/19/24       Improve lefs by 20%         PT Goal 3       Start:  08/21/24    Expected End:  11/19/24       SLS 3-5 seconds on right, 10 seconds on left         PT Goal 4       Start:  08/21/24    Expected End:  11/19/24       5/5 hip strength          PT Goal 5       Start:  08/21/24    Expected End:  11/19/24       Normalize gait with appropriate assistive device  Able to do steps reciprocally

## 2024-09-18 ENCOUNTER — TREATMENT (OUTPATIENT)
Dept: PHYSICAL THERAPY | Facility: CLINIC | Age: 74
End: 2024-09-18
Payer: MEDICARE

## 2024-09-18 DIAGNOSIS — G57.01 SCIATIC NERVE PALSY, RIGHT: ICD-10-CM

## 2024-09-18 PROCEDURE — 97110 THERAPEUTIC EXERCISES: CPT | Mod: GP

## 2024-09-18 NOTE — PROGRESS NOTES
Physical Therapy    Physical Therapy Treatment    Patient Name: Nancy Nuñez  MRN: [id number]  Today's Date: 9/18/2024  Time Entry:   Time Calculation  Start Time: 1133  Stop Time: 1153  Time Calculation (min): 20 min     PT Therapeutic Procedures Time Entry  Therapeutic Exercise Time Entry: 20    Visit: 7/16  Insurance: [employer]  Authorization: 8/21/24-11/19/24    Assessment:   Patient arrived to session not feeling well due to having received both COVID and FLU vaccinations yesterday, she was agreeable to attempt PT session this date as they arrived here, however, after aerobic conditioning on seated stepper patient reported not feeling well and desired to end session. Following the seated stepper, patient appeared pale and fatigued, therapist walked patient out to car for safety and assisted with car transfer. Discussed with patient and  if she should feel better later on today to perform home program to at least focus on step stretch and 3 way hip for strengthening, both expressed understanding.    Plan: Continue with POC.        Current Problem  1. Sciatic nerve palsy, right  Follow Up In Physical Therapy          Subjective  Patient arrives 15 min late to session this date and was accommodated.   Patient's  states that she had received the COVID and Flu shot yesterday and is not feeling too well today. Patient states that she is agreeable to participate in PT session today as much as she can tolerate. Following aerobic conditioning on bike, patient reported not feeling well and desired to end session at that time, PT walked patient out to car d/t concern for safety as she demonstrated increased fatigue.    Precautions: fall risk,CHIVO precautions       Pain   0/10 R hip pain    Objective     Treatments:  Therapeutic exercise x 20 min  SciFit stepper lvl 2.0 x 10 minutes for Hip ROM and warm-up  Functional ambulation with RW from waiting room to bike ~40-50 feet at [facility]  Functional ambulation  with RW from clinic to parked car in parking lot ~150 feet at [facility]  Functional car transfer at [facility]  Discussed with patient and  if she should feel better later on today to perform home program to at least focus on step stretch and 3 way hip for strengthening    OP EDUCATION: HEP  Access Code: [email or web address]  URL: [email or web address]  Date: 09/12/2024  Prepared by: Armando Quintanilla    Exercises  - Side Stepping with Resistance at Ankles and Counter Support  - 1 x daily - 7 x weekly - 3 sets - 10 reps  - Forward and Backward Monster Walk with Resistance at Ankles and Counter Support  - 1 x daily - 7 x weekly - 3 sets - 10 reps    Goals:  Active       PT Problem       PT Goal 1       Start:  08/21/24    Expected End:  11/19/24       Right leg pain 5/10 or less         PT Goal 2       Start:  08/21/24    Expected End:  11/19/24       Improve lefs by 20%         PT Goal 3       Start:  08/21/24    Expected End:  11/19/24       SLS 3-5 seconds on right, 10 seconds on left         PT Goal 4       Start:  08/21/24    Expected End:  11/19/24       5/5 hip strength          PT Goal 5       Start:  08/21/24    Expected End:  11/19/24       Normalize gait with appropriate assistive device  Able to do steps reciprocally

## 2024-09-23 ENCOUNTER — TREATMENT (OUTPATIENT)
Dept: PHYSICAL THERAPY | Facility: CLINIC | Age: 74
End: 2024-09-23
Payer: MEDICARE

## 2024-09-23 DIAGNOSIS — G57.01 SCIATIC NERVE PALSY, RIGHT: ICD-10-CM

## 2024-09-23 PROCEDURE — 97112 NEUROMUSCULAR REEDUCATION: CPT | Mod: GP

## 2024-09-23 NOTE — PROGRESS NOTES
Physical Therapy    Physical Therapy Treatment    Patient Name: Nancy Nuñez  MRN: [id number]  Today's Date: 9/23/2024  Time Entry:   Time Calculation  Start Time: 1355  Stop Time: 1431  Time Calculation (min): 36 min     PT Therapeutic Procedures Time Entry  Neuromuscular Re-Education Time Entry: 28  Therapeutic Exercise Time Entry: 8    Visit: 8/16  Insurance: [employer]  Authorization: 8/21/24-11/19/24    Assessment:   Patient tolerated al therapeutic activities well without overt imbalance during scavenger hunt with SPC as she demonstrated good sequencing and anticipatory balance with reaching and bending. Patient was challenged with reciprocal low pily negotiation with SPC as she continues to have difficulty with dynamic SLS stability R>L. She was able to complete forward and retro monster walks with increased time and no UE support to focus on LE functional strength and balance. Educated patient and  to perform ambulation with SPC at home to allow for carry-over and improved stability with gait.     Plan: Continue with POC. Consider SLS stability actives, obstacle course with SPC, ambulation/balance on compliant surfaces       Current Problem  1. Sciatic nerve palsy, right  Follow Up In Physical Therapy            Subjective  Patient arrives 10 min late to session this date and was accommodated.   Patient states she is well today. States that she is not walking with cane at home often, reinforced benefit and need to practice walking with SPC in home to improve balance.     Precautions: fall risk,CHIVO precautions       Pain   0/10 R hip pain    Objective     Treatments:  Therapeutic exercise x 8 min  SciFit bike lvl 2.5 x 8 minutes for Hip ROM and warm-up    Neuromuscular Re-education x 28 min  Scavenger hunt for 8 cones with SPC at supervision x 200 feet with cognitive dual task of naming objects in grocery store - pt with good cane sequencing and balance with reaching and bending  Review of  curb/stair negotiation with SPC  Functional stair negotiation (2 x 3 steps) with SPC at [facility]   Forward 5 Low Eleuterio negotiation x 3 passes d/b with SPC  Lateral 5 Low Eleuterio negotiation x 3 passes d/b with HHA  Forward and Retro monster walks with HHA x 10 ' ea  Forward and Retro monster walks without UE support x 10 ' ea  Ambulation without device at CGA x 30'    OP EDUCATION: [facility]  Access Code: [email or web address]  URL: [email or web address]  Date: 09/12/2024  Prepared by: Armando Quintanilla    Exercises  - Side Stepping with Resistance at Ankles and Counter Support  - 1 x daily - 7 x weekly - 3 sets - 10 reps  - Forward and Backward Monster Walk with Resistance at Ankles and Counter Support  - 1 x daily - 7 x weekly - 3 sets - 10 reps    Goals:  Active       PT Problem       PT Goal 1       Start:  08/21/24    Expected End:  11/19/24       Right leg pain 5/10 or less         PT Goal 2       Start:  08/21/24    Expected End:  11/19/24       Improve lefs by 20%         PT Goal 3       Start:  08/21/24    Expected End:  11/19/24       SLS 3-5 seconds on right, 10 seconds on left         PT Goal 4       Start:  08/21/24    Expected End:  11/19/24       5/5 hip strength          PT Goal 5       Start:  08/21/24    Expected End:  11/19/24       Normalize gait with appropriate assistive device  Able to do steps reciprocally

## 2024-09-25 ENCOUNTER — APPOINTMENT (OUTPATIENT)
Dept: PHYSICAL THERAPY | Facility: CLINIC | Age: 74
End: 2024-09-25
Payer: MEDICARE

## 2024-09-26 ENCOUNTER — DOCUMENTATION (OUTPATIENT)
Dept: PHYSICAL THERAPY | Facility: CLINIC | Age: 74
End: 2024-09-26
Payer: MEDICARE

## 2024-09-26 ENCOUNTER — TREATMENT (OUTPATIENT)
Dept: PHYSICAL THERAPY | Facility: CLINIC | Age: 74
End: 2024-09-26
Payer: MEDICARE

## 2024-09-26 DIAGNOSIS — G57.01 SCIATIC NERVE PALSY, RIGHT: ICD-10-CM

## 2024-09-26 NOTE — PROGRESS NOTES
Patient seen in PT for follow up PT visit.  Patient not feeling well with stomach discomfort and dizziness.  Patient's sitting BP is 140/67 and standing BP is 99/62.  Patient unable to get into syncope clinic until January.  Patient encouraged to call office to try and get an earlier appointment.  Patient also encouraged to wear compression stockings every day.  Patient wished to do 5 minutes on SciFit stepper without difficulty.  Cancelled today's appointment due to patient not feeling well.   Rufino Ojeda PT

## 2024-09-30 ENCOUNTER — TREATMENT (OUTPATIENT)
Dept: PHYSICAL THERAPY | Facility: CLINIC | Age: 74
End: 2024-09-30
Payer: MEDICARE

## 2024-09-30 DIAGNOSIS — G57.01 SCIATIC NERVE PALSY, RIGHT: ICD-10-CM

## 2024-09-30 PROCEDURE — 97110 THERAPEUTIC EXERCISES: CPT | Mod: GP | Performed by: PHYSICAL THERAPIST

## 2024-09-30 NOTE — PROGRESS NOTES
"Physical Therapy    Physical Therapy Treatment    Patient Name: Nancy Nuñez  MRN: [id number]  Today's Date: 9/30/2024  Time Entry:   Time Calculation  Start Time: 0150  Stop Time: 0230  Time Calculation (min): 40 min     PT Therapeutic Procedures Time Entry  Therapeutic Exercise Time Entry: 40    Visit: 9/16  Insurance: [employer]  Authorization: 8/21/24-11/19/24    Assessment: Patient with no complaint of hip pain at this time.  Able to do 6\" steps reciprocally with rails in PT and improved gait with only mild disorganized gait at times.        Plan: Continue with POC. Consider SLS stability actives, obstacle course with SPC, ambulation/balance on compliant surfaces       Current Problem  1. Sciatic nerve palsy, right  Follow Up In Physical Therapy            Subjective      No hip pain    Precautions: fall risk,CHIVO precautions       Pain   0/10 R hip pain      Objective     Patient able to do 6\" steps reciprocally with railing with close supervision  Fairly normal gait with cane and VC for heel to toe walking with AFO.    Treatments:  Therapeutic exercise x 8 min  SciFit stepper 20X  Patient walked with cane 200' X 2 with close sup  Step ups 20X   Up and down steps X 3 (6\")  F+B in parallel bars X 5 without holding on  Marching with walking in parallel bars F+B X 1  Hip er/flex with walking in parallel bars F+B X 1  Knee flexion with walking in parallel bars F+B X 1  (40 minutes)    Neuromuscular Re-education x 28 min  Scavenger hunt for 8 cones with SPC at supervision x 200 feet with cognitive dual task of naming objects in grocery store - pt with good cane sequencing and balance with reaching and bending  Review of curb/stair negotiation with SPC  Functional stair negotiation (2 x 3 steps) with SPC at [facility]   Forward 5 Low Eleuterio negotiation x 3 passes d/b with SPC  Lateral 5 Low Eleuterio negotiation x 3 passes d/b with HHA  Forward and Retro monster walks with HHA x 10 ' ea  Forward and Retro monster walks " without UE support x 10 ' ea  Ambulation without device at [facility] x 30'    OP EDUCATION: HEP  Access Code: [email or web address]  URL: [email or web address]  Date: 09/12/2024  Prepared by: Armando Quintanilla    Exercises  - Side Stepping with Resistance at Ankles and Counter Support  - 1 x daily - 7 x weekly - 3 sets - 10 reps  - Forward and Backward Monster Walk with Resistance at Ankles and Counter Support  - 1 x daily - 7 x weekly - 3 sets - 10 reps    Goals:  Active       PT Problem       PT Goal 1       Start:  08/21/24    Expected End:  11/19/24       Right leg pain 5/10 or less         PT Goal 2       Start:  08/21/24    Expected End:  11/19/24       Improve lefs by 20%         PT Goal 3       Start:  08/21/24    Expected End:  11/19/24       SLS 3-5 seconds on right, 10 seconds on left         PT Goal 4       Start:  08/21/24    Expected End:  11/19/24       5/5 hip strength          PT Goal 5       Start:  08/21/24    Expected End:  11/19/24       Normalize gait with appropriate assistive device  Able to do steps reciprocally

## 2024-10-02 ENCOUNTER — TREATMENT (OUTPATIENT)
Dept: PHYSICAL THERAPY | Facility: CLINIC | Age: 74
End: 2024-10-02
Payer: MEDICARE

## 2024-10-02 DIAGNOSIS — G57.01 SCIATIC NERVE PALSY, RIGHT: ICD-10-CM

## 2024-10-02 PROCEDURE — 97110 THERAPEUTIC EXERCISES: CPT | Mod: GP | Performed by: PHYSICAL THERAPIST

## 2024-10-02 NOTE — PROGRESS NOTES
"Physical Therapy    Physical Therapy Treatment    Patient Name: Nancy Nuñez  MRN: [id number]  Today's Date: 10/2/2024  Time Entry:   Time Calculation  Start Time: 0145  Stop Time: 0230  Time Calculation (min): 45 min          Visit: 10/16  Insurance: [employer]  Authorization: 8/21/24-11/19/24    Assessment:  Patient with continued improvement with balance and mobility.  Patient has started walking at home with close supervision from .        Plan: Continue with POC. Consider SLS stability actives, obstacle course with SPC, ambulation/balance on compliant surfaces       Current Problem  1. Sciatic nerve palsy, right  Follow Up In Physical Therapy              Subjective      No hip pain    Precautions: fall risk,CHIVO precautions       Pain   0/10 R hip pain      Objective         Treatments:  Therapeutic exercise x 10 min  SciFit stepper 20X  Patient walked with cane 200' X 2 with close sup  Step ups 20X   Up and down steps X 3 (6\")  F+B in parallel bars X 5 without holding on  Step forward and step back from blue foam 10X without holding on in // bars  Step sideways from blue foam 10X each without holding on in // bars  Marching with walking in parallel bars F+B X 1  Hip er/flex with walking in parallel bars F+B X 1  Knee flexion with walking in parallel bars F+B X 1  (45 minutes)    Not today  Neuromuscular Re-education x 28 min  Scavenger hunt for 8 cones with SPC at supervision x 200 feet with cognitive dual task of naming objects in grocery store - pt with good cane sequencing and balance with reaching and bending  Review of curb/stair negotiation with SPC  Functional stair negotiation (2 x 3 steps) with SPC at [facility]   Forward 5 Low Eleuterio negotiation x 3 passes d/b with SPC  Lateral 5 Low Eleuterio negotiation x 3 passes d/b with HHA  Forward and Retro monster walks with HHA x 10 ' ea  Forward and Retro monster walks without UE support x 10 ' ea  Ambulation without device at C[facility] x 30'    OP EDUCATION: " [facility]  Access Code: [email or web address]  URL: [email or web address]  Date: 09/12/2024  Prepared by: Armando Quintanilla    Exercises  - Side Stepping with Resistance at Ankles and Counter Support  - 1 x daily - 7 x weekly - 3 sets - 10 reps  - Forward and Backward Monster Walk with Resistance at Ankles and Counter Support  - 1 x daily - 7 x weekly - 3 sets - 10 reps    Goals:  Active       PT Problem       PT Goal 1       Start:  08/21/24    Expected End:  11/19/24       Right leg pain 5/10 or less         PT Goal 2       Start:  08/21/24    Expected End:  11/19/24       Improve lefs by 20%         PT Goal 3       Start:  08/21/24    Expected End:  11/19/24       SLS 3-5 seconds on right, 10 seconds on left         PT Goal 4       Start:  08/21/24    Expected End:  11/19/24       5/5 hip strength          PT Goal 5       Start:  08/21/24    Expected End:  11/19/24       Normalize gait with appropriate assistive device  Able to do steps reciprocally

## 2024-10-08 ENCOUNTER — TREATMENT (OUTPATIENT)
Dept: PHYSICAL THERAPY | Facility: CLINIC | Age: 74
End: 2024-10-08
Payer: MEDICARE

## 2024-10-08 DIAGNOSIS — G57.01 SCIATIC NERVE PALSY, RIGHT: ICD-10-CM

## 2024-10-08 PROCEDURE — 97112 NEUROMUSCULAR REEDUCATION: CPT | Mod: GP

## 2024-10-08 PROCEDURE — 97110 THERAPEUTIC EXERCISES: CPT | Mod: GP

## 2024-10-08 NOTE — PROGRESS NOTES
Physical Therapy    Physical Therapy Treatment    Patient Name: Nancy Nuñez  MRN: [id number]  Today's Date: 10/8/2024  Time Entry:   Time Calculation  Start Time: 1503  Stop Time: 1545  Time Calculation (min): 42 min     PT Therapeutic Procedures Time Entry  Neuromuscular Re-Education Time Entry: 15  Therapeutic Exercise Time Entry: 27    Visit: 11/16  Insurance: [employer]na Medicare  Authorization: 8/21/24-11/19/24    Assessment:    Progress check was performed this date as Nancy Nuñez has completed 11 sessions of physical therapy treatment with emphasis upon education, strengthening, ROM, balance, and gait training. Patient has demonstrated significant improvement of R hip pain as she reports 0/10 pain. Patient is progressing in R hip and knee strength based on MMT test. Patient is demonstrating improved SLS balance bilaterally. She is gradually improving her gait mechanics with moderate verbal cues for heel strike. Currently, Nancy Nuñez is making progress towards all established PT POC goals at this time. She would continue to benefit from skilled PT services to address her remaining impairments to restore her  PLOF/activity tolerance. Nancy Nuñez voiced agreement and satisfaction with this plan.    Educated pt and  on importance of walking with SPC at home and outdoors on drive to allow for carry-over of task. Educated  on importance of error-less learning for individuals with memory deficits and need to frequently cue Nancy on heel strike and toe off norman on R LE. Educated patient and  that the RW is the safest mobility device for Nancy as she has occasional LOB in retropulsion.     Plan: Continue with POC. Consider SLS stability actives, obstacle course with SPC, ambulation/balance on compliant surfaces       Current Problem  1. Sciatic nerve palsy, right  Follow Up In Physical Therapy            Subjective    Patient states she is well and walking some with cane and  at home but not  "much.     Precautions: fall risk,CHIVO precautions       Pain   0/10 R hip pain      Objective     Posture: wearing Anterior CF AFO on right   Left LE strength: 4+/5 hip abd, 4+/5 hip add, 4+/5 hip flexion, 5/5 hip extension,   Right LE strength: 4+/5 hip abd, 4+/5 hip add, 4+/5 hip flexion, 4+/5 hip extension,  5/5 knee extension, 4+/5 knee flexion, 0/5 dorsiflexion, 5/5 plantarflexion   Gait: Patient ambulates into clinic with RW and R CF AFO donned. She demonstrated R toe drag requiring cue for heel strike at initial contact (~50% of time), reciprocal pattern. She ambulated with SPC and R CF AFO at Close Supervision demonstrating reciprocal pattern, cues for heel strike, increased WB on SPC  Balance: 10 sec SLS on left, 5 sec SLS on right  Stairs:  one at a time with R HR and SPC at close supervision    Outcome Measures:  Other Measures  Lower Extremity Funtional Score (LEFS): 11      Treatments:    Therapeutic Exercise x 27 min  SciFit stepper lvl 2 x 6 min  Staggered stance mini squat 3 x 10  In // bars at [facility] and hoovering hands:  - SLS R/L x 3 trials each LE  - Lateral stepping with green TB at ankles x 3 passes d/b    Assessment of pt's POC goals completed today- see objective, goals, and assessment section. Educated pt regarding results of examination findings and discussed next steps in POC progression. Educated pt regarding importance of continuing with good HEP compliance for optimal rehab results.   Educated pt and  on importance of walking with SPC at home and outdoors on drive to allow for carry-over of task  Educated  on importance of error-less learning for individuals with memory deficits and need to frequently cue Nancy on heel strike and toe off norman on R LE   Educated patient and  that the RW is the safest mobility device for Nancy as she has occasional LOB in retropulsion     Gait Training x 15 min  Functional 6\" stair negotiation with R HR and SPC at  demonstrating " non-reciprocal pattern - up/down 3 x 3 steps  Functional ambulation with SPC and R CF AFO at [facility] with VC for heel strike x 100 feet (pt with 1 episode of LOB requiring therapist assist to maintain balance  Car transfer via stand pivot (patient demonstrated retropulsion in standing prior to pivot requiring assist to maintain upright balance)    In // bars at [facility] and hoovering hands:  - Pre-gait activity: step over pily with ant wt shift and back 2 x 10 R LE  **instructed patient and  to perform at home as part of home program to allow for carry-over**      OP EDUCATION: HEP  Access Code: [email or web address]  URL: [email or web address]  Date: 09/12/2024  Prepared by: Armando Quintanilla    Exercises  - Side Stepping with Resistance at Ankles and Counter Support  - 1 x daily - 7 x weekly - 3 sets - 10 reps  - Forward and Backward Monster Walk with Resistance at Ankles and Counter Support  - 1 x daily - 7 x weekly - 3 sets - 10 reps    Goals:  Active       PT Problem       PT Goal 1 (Met)       Start:  08/21/24    Expected End:  11/19/24    Resolved:  10/08/24    Right leg pain 5/10 or less         PT Goal 2 (Progressing)       Start:  08/21/24    Expected End:  11/19/24       Improve lefs by 20%         PT Goal 3 (Met)       Start:  08/21/24    Expected End:  11/19/24    Resolved:  10/08/24    SLS 3-5 seconds on right, 10 seconds on left         PT Goal 4 (Progressing)       Start:  08/21/24    Expected End:  11/19/24       5/5 hip strength          PT Goal 5 (Progressing)       Start:  08/21/24    Expected End:  11/19/24       Normalize gait with appropriate assistive device  Able to do steps reciprocally

## 2024-10-14 ENCOUNTER — TREATMENT (OUTPATIENT)
Dept: PHYSICAL THERAPY | Facility: CLINIC | Age: 74
End: 2024-10-14
Payer: MEDICARE

## 2024-10-14 ENCOUNTER — APPOINTMENT (OUTPATIENT)
Dept: PHYSICAL THERAPY | Facility: CLINIC | Age: 74
End: 2024-10-14
Payer: MEDICARE

## 2024-10-14 DIAGNOSIS — G57.01 SCIATIC NERVE PALSY, RIGHT: ICD-10-CM

## 2024-10-14 PROCEDURE — 97112 NEUROMUSCULAR REEDUCATION: CPT | Mod: GP

## 2024-10-14 NOTE — PROGRESS NOTES
Physical Therapy    Physical Therapy Treatment    Patient Name: Nancy Nuñez  MRN: [id number]  Today's Date: 10/14/2024  Time Entry:   Time Calculation  Start Time: 1353  Stop Time: 1430  Time Calculation (min): 37 min     PT Therapeutic Procedures Time Entry  Neuromuscular Re-Education Time Entry: 30    Visit: 12/16  Insurance: [employer]  Authorization: 8/21/24-11/19/24    Assessment:    Patient continues to demonstrate slight unsteadiness during ambulation with SPC as well as decreased step height R>L, and occasional toe drag which improves with cues. Patient demonstrated increased difficulty with rocker board balance as she rends to be retropulsion requiring min A from therapist to recover, discussed use of hip and ankle strategies to maintain and regain upright balance.     Plan: Continue with POC. Consider SLS stability actives, obstacle course with SPC, ambulation/balance on compliant surfaces       Current Problem  1. Sciatic nerve palsy, right  Follow Up In Physical Therapy            Subjective    Patient states she is well and is walking with cane at home a little bit.    Precautions: fall risk,CHIVO precautions       Pain   0/10 R hip pain      Objective     Treatments:  Neuromuscular Re-Education x 30 min  Functional ambulation with SPC at CGA 2 x 220 feet  Sit to stand with hands at knees x 10  - Obstacle course fwd (4 hurdles, airex, yoga mat with towels under) x 2 passes d/b with SPC at CGA  - Obstacle course laterally (4 hurdles, airex, yoga mat with towels under) x 2 passes d/b with SPC at CGA  - Standing NBOS on airex 2 x 1 min  - Standing NBOS on airex with therapist perturbations x 2 min  - Standing on rocker board maintaining neutral (pt with tendency for retropulsion) 2 x 2 min  - Ant/Post wt shift on rocker board x 12 ea  - Standing on rocker board maintaining neutral with might therapist perturbations x 2 min    OP EDUCATION: HEP  Access Code: [email or web address]  URL:  [email or web address]  Date: 09/12/2024  Prepared by: Armando Quintanilla    Exercises  - Side Stepping with Resistance at Ankles and Counter Support  - 1 x daily - 7 x weekly - 3 sets - 10 reps  - Forward and Backward Monster Walk with Resistance at Ankles and Counter Support  - 1 x daily - 7 x weekly - 3 sets - 10 reps    Goals:  Active       PT Problem       PT Goal 1 (Met)       Start:  08/21/24    Expected End:  11/19/24    Resolved:  10/08/24    Right leg pain 5/10 or less         PT Goal 2 (Progressing)       Start:  08/21/24    Expected End:  11/19/24       Improve lefs by 20%         PT Goal 3 (Met)       Start:  08/21/24    Expected End:  11/19/24    Resolved:  10/08/24    SLS 3-5 seconds on right, 10 seconds on left         PT Goal 4 (Progressing)       Start:  08/21/24    Expected End:  11/19/24       5/5 hip strength          PT Goal 5 (Progressing)       Start:  08/21/24    Expected End:  11/19/24       Normalize gait with appropriate assistive device  Able to do steps reciprocally

## 2024-10-17 ENCOUNTER — OFFICE VISIT (OUTPATIENT)
Dept: ORTHOPEDIC SURGERY | Facility: CLINIC | Age: 74
End: 2024-10-17
Payer: MEDICARE

## 2024-10-17 ENCOUNTER — HOSPITAL ENCOUNTER (OUTPATIENT)
Dept: RADIOLOGY | Facility: CLINIC | Age: 74
Discharge: HOME | End: 2024-10-17
Payer: MEDICARE

## 2024-10-17 DIAGNOSIS — Z47.1 AFTERCARE FOLLOWING RIGHT HIP JOINT REPLACEMENT SURGERY: ICD-10-CM

## 2024-10-17 DIAGNOSIS — Z96.641 AFTERCARE FOLLOWING RIGHT HIP JOINT REPLACEMENT SURGERY: Primary | ICD-10-CM

## 2024-10-17 DIAGNOSIS — M79.605 LEFT LEG PAIN: ICD-10-CM

## 2024-10-17 DIAGNOSIS — Z96.641 AFTERCARE FOLLOWING RIGHT HIP JOINT REPLACEMENT SURGERY: ICD-10-CM

## 2024-10-17 DIAGNOSIS — Z47.1 AFTERCARE FOLLOWING RIGHT HIP JOINT REPLACEMENT SURGERY: Primary | ICD-10-CM

## 2024-10-17 PROCEDURE — 99214 OFFICE O/P EST MOD 30 MIN: CPT | Performed by: PHYSICIAN ASSISTANT

## 2024-10-17 PROCEDURE — 73502 X-RAY EXAM HIP UNI 2-3 VIEWS: CPT | Mod: RT

## 2024-10-17 RX ORDER — FLUOXETINE HYDROCHLORIDE 20 MG/1
3 CAPSULE ORAL
COMMUNITY
Start: 2024-09-10

## 2024-10-17 RX ORDER — ATORVASTATIN CALCIUM 10 MG/1
10 TABLET, FILM COATED ORAL
COMMUNITY
Start: 2024-09-17

## 2024-10-17 RX ORDER — ARIPIPRAZOLE 2 MG/1
TABLET ORAL
COMMUNITY
Start: 2024-10-03

## 2024-10-17 ASSESSMENT — PAIN - FUNCTIONAL ASSESSMENT: PAIN_FUNCTIONAL_ASSESSMENT: NO/DENIES PAIN

## 2024-10-17 NOTE — PROGRESS NOTES
CHRISTI Merchant, PABlaneC, ATC  Adult Reconstruction and Joint Replacement Surgery  Phone: [phone]     Fax:[phone]            Chief Complaint   Patient presents with    Right Hip - Follow-up       HPI:    Nancy Nuñez is a pleasant 74 y.o. year-old female here for follow-up of their side: right total hip arthroplasty by Dr. Rodriguez.  The patient is approximately 4 month(s) postop.The patient has no mechanical symptoms.  The patient has no swelling and pain.   The patients wound has healed uneventfully.  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  She has been wearing a custom AFO since.  She has been continuing with physical therapy and using an NMES.  Review of Systems  Past Medical History:   Diagnosis Date    Anxiety and depression     Inpatient stays x 2 in 2010    Cataract     Bilaterally    H. pylori infection     1/2024 per EGD    HLD (hyperlipidemia)     HTN (hypertension)     Hyponatremia     Chronic Low normal - low    Hypothyroidism     due to Hashimoto's thyroiditis clinically and biochemically euthyroid on replacement    Osteoarthritis     Osteoporosis     Posterior vitreous detachment of right eye      Patient Active Problem List   Diagnosis    Unilateral primary osteoarthritis, right hip    Chronic idiopathic constipation    Primary osteoarthritis of right hip    Hip arthritis     Medication Documentation Review Audit       Reviewed by Mallory Thomas LPN (Licensed Nurse) on 10/17/24 at 1417      Medication Order Taking? Sig Documenting Provider Last Dose Status   alendronate (Fosamax) 70 mg tablet [id number] Yes Take 1 tablet (70 mg) by mouth every 7 days. Historical Provider, MD  Active   amLODIPine (Norvasc) 2.5 mg tablet [id number] Yes Take 1 tablet (2.5 mg) by mouth once daily. Take BP prior to resuming medication. Take medication if BP is > or = 130/90. Zina Ray PA-C  Active   amoxicillin (Amoxil) 500 mg capsule [id number] Yes Take 4 Tablets 1 Hour Prior to Dental  Procedure or Cleaning. Candace Mc PA-C  Active   ARIPiprazole (Abilify) 2 mg tablet [id number] Yes  Historical Provider, MD  Active   atorvastatin (Lipitor) 10 mg tablet [id number] Yes Take 1 tablet (10 mg) by mouth once daily. Historical Provider, MD  Active   escitalopram (Lexapro) 10 mg tablet [id number] Yes Take 2 tablets (20 mg) by mouth once daily at bedtime. Historical Provider, MD  Active   FLUoxetine (PROzac) 20 mg capsule [id number] Yes Take 3 capsules (60 mg) by mouth early in the morning.. Historical Provider, MD  Active   levothyroxine (Synthroid, Levoxyl) 112 mcg tablet [id number] Yes Take 1 tablet (112 mcg) by mouth once daily. X 6 days, not on Sundays Historical Provider, MD  Active   LORazepam (Ativan) 0.5 mg tablet [id number] Yes Take 1 tablet (0.5 mg) by mouth every 6 hours if needed for anxiety. Historical Provider, MD  Active   pantoprazole (ProtoNix) 40 mg EC tablet [id number]  Take 1 tablet (40 mg) by mouth once daily. Do not crush, chew, or split. Madan Avila MD   24 2359   simvastatin (Zocor) 10 mg tablet [id number] Yes Take 1 tablet (10 mg) by mouth once daily at bedtime. Historical Provider, MD  Active                  Allergies   Allergen Reactions    Cat Dander Runny nose    Dog Dander Runny nose    House Dust Runny nose    Mold Runny nose     Social History     Socioeconomic History    Marital status:      Spouse name: Not on file    Number of children: Not on file    Years of education: Not on file    Highest education level: Not on file   Occupational History    Not on file   Tobacco Use    Smoking status: Former     Types: Cigarettes     Passive exposure: Past    Smokeless tobacco: Never   Substance and Sexual Activity    Alcohol use: Not on file    Drug use: Not on file    Sexual activity: Not on file   Other Topics Concern    Not on file   Social History Narrative    Not on file     Social Drivers of Health     Financial Resource Strain: Low Risk  (2024)     Received from [facility]    Overall Financial Resource Strain (CARDIA)     Difficulty of Paying Living Expenses: Not very hard   Food Insecurity: No Food Insecurity (8/9/2024)    Received from [facility]    Hunger Vital Sign     Worried About Running Out of Food in the Last Year: Never true     Ran Out of Food in the Last Year: Never true   Transportation Needs: No Transportation Needs (8/9/2024)    Received from [facility]    PRAPARE - Transportation     Lack of Transportation (Medical): No     Lack of Transportation (Non-Medical): No   Physical Activity: Inactive (8/9/2024)    Received from [facility]    Exercise Vital Sign     Days of Exercise per Week: 0 days     Minutes of Exercise per Session: 0 min   Stress: Stress Concern Present (8/9/2024)    Received from [facility]    [place] [place] [employer] - Occupational Stress Questionnaire     Feeling of Stress : Rather much   Social Connections: Unknown (8/9/2024)    Received from [facility]    Social Connection and Isolation Panel [NHANES]     Frequency of Communication with Friends and Family: More than three times a week     Frequency of Social Gatherings with Friends and Family: Patient declined     Attends Temple Services: Patient declined     Active Member of Clubs or Organizations: Patient declined     Attends Club or Organization Meetings: Patient declined     Marital Status:    Recent Concern: Social Connections - Moderately Isolated (6/5/2024)    Social Connection and Isolation Panel [NHANES]     Frequency of Communication with Friends and Family: More than three times a week     Frequency of Social Gatherings with Friends and Family: More than three times a week     Attends Temple Services: Never     Active Member of Clubs or Organizations: No     Attends Club or Organization Meetings: Never     Marital Status:    Intimate Partner Violence: Not At Risk (6/5/2024)    Humiliation,  Afraid, Rape, and Kick questionnaire     Fear of Current or Ex-Partner: No     Emotionally Abused: No     Physically Abused: No     Sexually Abused: No   Housing Stability: Low Risk  (6/5/2024)    Housing Stability Vital Sign     Unable to Pay for Housing in the Last Year: No     Number of Places Lived in the Last Year: 1     Unstable Housing in the Last Year: No     Past Surgical History:   Procedure Laterality Date    COLONOSCOPY      Internal hemorrhoids, diverticulosis, polyps    ESOPHAGOSCOPY / EGD      HYSTEROSCOPY  09/22/2006    RHINOPLASTY      TONSILLECTOMY      TOTAL HIP ARTHROPLASTY Bilateral 06/05/2024    Left 4/20/2022, Right 6/5/2024       Physical Exam  side: right Hip  There were no vitals filed for this visit.  AxO x 3 in NAD, appears stated age. Cooperative.   Assistive Device:  Wheelchair . Coordination and balance intact.  Skin inact over bilateral upper and lower extremities, no erythema, ecchymosis, temperature changes. No popliteal lymphadenopathy,  No other overlying lesion  mood: euthymic  Respirations non labored  Surgical hip demonstrates pain free ROM with mild tenderness to palpation over greater trochanter laterally.  The incision is midline healing well with no signs of surrounding infection, dehiscence or drainage.   Neurovascularly back to baseline  5/5 hip flexion/knee extension  0 DF  5/5PF  1/5EHL  SILT in mathew/saph/ per/tib distribution   Extremities warm and well perfused.  No lower extremity calf tenderness or swelling  2+ Femoral/DP/PT pulses bilaterally    IMAGING:  No images are attached to the encounter.    I personally reviewed multiple views of the hip today in clinic.  Status post side: right Total Hip Arthroplasty. The implant is well fixed, well aligned.  No evidence of wilmer-implant fracture, lucency or dislocation. Leg lengths closely restored.    Impression/Plan:    Nancy Nuñez is doing well post-operatively and happy with the results of the operation.     S/P Total  side: right Hip  Arthroplasty-I discussed with the patient that is unclear as to the etiology of her dropfoot.  We will get an EMG and nerve conduction study to evaluate.  She should continue to wear her AFO.  Also reinforced, that she should not be driving until this is resolved.  I would like her to follow-up with Dr.Steven Rodriguez after the EMG to go over the results with her and further treatment options    This case was also discussed with Dr. Rodriguez who also agrees with the treatment plan.    All questions were answered.      Follow-up with Dr.Steven Rodriguez after EMG, and nerve conduction study.    Candace Mc MPAS, PA-C, ATC  Orthopedic Physician Assisant  Adult Reconstruction and Total Joint Replacement  General Orthopedics  Department of Orthopaedic Surgery  [place] [phone]  Albert messaging preferred

## 2024-10-21 ENCOUNTER — APPOINTMENT (OUTPATIENT)
Dept: PHYSICAL THERAPY | Facility: CLINIC | Age: 74
End: 2024-10-21
Payer: MEDICARE

## 2024-10-23 ENCOUNTER — TREATMENT (OUTPATIENT)
Dept: PHYSICAL THERAPY | Facility: CLINIC | Age: 74
End: 2024-10-23
Payer: MEDICARE

## 2024-10-23 DIAGNOSIS — G57.01 SCIATIC NERVE PALSY, RIGHT: ICD-10-CM

## 2024-10-23 PROCEDURE — 97110 THERAPEUTIC EXERCISES: CPT | Mod: GP

## 2024-10-23 PROCEDURE — 97112 NEUROMUSCULAR REEDUCATION: CPT | Mod: GP

## 2024-10-23 NOTE — PROGRESS NOTES
"Physical Therapy    Physical Therapy Treatment    Patient Name: Nancy Nuñez  MRN: [id number]  Today's Date: 10/23/2024  Time Entry:   Time Calculation  Start Time: 1347  Stop Time: 1425  Time Calculation (min): 38 min     PT Therapeutic Procedures Time Entry  Neuromuscular Re-Education Time Entry: 30  Therapeutic Exercise Time Entry: 8    Visit: 13/16  Insurance: [employer] Medicare  Authorization: 8/21/24-11/19/24    Assessment:    Patient tolerated session well prior to feeling of nausea and upset stomach toward end of session in which she was escorted to car by therapist to ensure safety. Patient performed step ups at 6\" step without UE support at [facility] in order to focus on balance and strength. She demonstrated slight instability with obstacle course norman river stones and compliant surface in which Min A to CGA was required to maintain balance. Her reciprocal negotiation of hurdles is improving when she is focused on task compared to addition of cognitive dual task. Her functional sit to stands without arms continue to be difficult as she requires min A for boost at times.     Plan: Continue with POC. Consider SLS stability actives, obstacle course with SPC, ambulation/balance on compliant surfaces       Current Problem  1. Sciatic nerve palsy, right  Follow Up In Physical Therapy          Subjective    Patient states she is well. Toward end of session pt with c/o nausea and upset stomach which continued following seated rest. We then walked out to the car to ensure safety with ambulation.     Precautions: fall risk,CHIVO precautions       Pain   0/10 R hip pain      Objective     Treatments:    Therapeutic Exercise x 8 min  - Seated stepper lvl 3 x 6 min for ROM and warm-up  - Functional ambulation with RW at CGA x 150 feet  *VC for increased step height*    Neuromuscular Re-Education x 30 min  - Step ups at 6\" step without UE support at CGA 2 x 10 R/L  - Sit to stand with hands at knees x 8  - Lateral monster walk with " red TB 2 x 10' d/b  - Forward/backward monster walk with red TB 2 x 10' ea  - Obstacle course fwd (4 hurdles, long airex, 4 hurdles) x 2 passes d/b at CGA  - Obstacle course fwd (4 hurdles, long airex, 4 hurdles) x 1 pass d/b at CGA with cognitive dual task        OP EDUCATION: HEP  Access Code: [email or web address]  URL: [email or web address]  Date: 09/12/2024  Prepared by: Armando Quintanilla    Exercises  - Side Stepping with Resistance at Ankles and Counter Support  - 1 x daily - 7 x weekly - 3 sets - 10 reps  - Forward and Backward Monster Walk with Resistance at Ankles and Counter Support  - 1 x daily - 7 x weekly - 3 sets - 10 reps    Goals:  Active       PT Problem       PT Goal 1 (Met)       Start:  08/21/24    Expected End:  11/19/24    Resolved:  10/08/24    Right leg pain 5/10 or less         PT Goal 2 (Progressing)       Start:  08/21/24    Expected End:  11/19/24       Improve lefs by 20%         PT Goal 3 (Met)       Start:  08/21/24    Expected End:  11/19/24    Resolved:  10/08/24    SLS 3-5 seconds on right, 10 seconds on left         PT Goal 4 (Progressing)       Start:  08/21/24    Expected End:  11/19/24       5/5 hip strength          PT Goal 5 (Progressing)       Start:  08/21/24    Expected End:  11/19/24       Normalize gait with appropriate assistive device  Able to do steps reciprocally

## 2024-10-29 ENCOUNTER — TREATMENT (OUTPATIENT)
Dept: PHYSICAL THERAPY | Facility: CLINIC | Age: 74
End: 2024-10-29
Payer: MEDICARE

## 2024-10-29 DIAGNOSIS — G57.01 SCIATIC NERVE PALSY, RIGHT: ICD-10-CM

## 2024-10-29 PROCEDURE — 97110 THERAPEUTIC EXERCISES: CPT | Mod: GP

## 2024-10-31 ENCOUNTER — TREATMENT (OUTPATIENT)
Dept: PHYSICAL THERAPY | Facility: CLINIC | Age: 74
End: 2024-10-31
Payer: MEDICARE

## 2024-10-31 DIAGNOSIS — G57.01 SCIATIC NERVE PALSY, RIGHT: ICD-10-CM

## 2024-10-31 PROCEDURE — 97110 THERAPEUTIC EXERCISES: CPT | Mod: GP

## 2024-11-05 ENCOUNTER — TREATMENT (OUTPATIENT)
Dept: PHYSICAL THERAPY | Facility: CLINIC | Age: 74
End: 2024-11-05
Payer: MEDICARE

## 2024-11-05 DIAGNOSIS — G57.01 SCIATIC NERVE PALSY, RIGHT: ICD-10-CM

## 2024-11-05 PROCEDURE — 97110 THERAPEUTIC EXERCISES: CPT | Mod: GP | Performed by: PHYSICAL THERAPIST

## 2024-11-05 NOTE — PROGRESS NOTES
"Physical Therapy    Physical Therapy Treatment    Patient Name: Nancy Nuñez  MRN: [id number]  Today's Date: 11/5/2024  Time Entry:   Time Calculation  Start Time: 0400  Stop Time: 0445  Time Calculation (min): 45 min     PT Therapeutic Procedures Time Entry  Therapeutic Exercise Time Entry: 45    Visit: 16/17  Insurance: [employer]na Medicare  Authorization: 8/21/24-11/19/24    Assessment:  Patient with good progress towards PT goals - still has hip weakness to 4/5 but this is equal to her left hip strength.        Plan: Continue with POC  Discharge to [facility] next visit        Current Problem  1. Sciatic nerve palsy, right  Follow Up In Physical Therapy          Subjective    Patient states she is okay.    Precautions: fall risk,CHIVO precautions       Pain   3/10 R hip pain at worse      Objective     10 sec SLS bilaterally  5/5 hip add, 4/5 hip abd, flex and ext 20X each  Fairly normal gait with rolling walker and AFO  Able to do steps reciprocally   LEFS = 22.5%    Treatments:    Therapeutic Exercise x 40 minutes  SciFit stepper 10 minutes  Up and down steps X 3 reciprocallly  SLS 5X each  Step ups 6\" 20X  Standing hip ext, abd, and flexion 20X each  Patient walked 200' without device and AFO and close sup  Parallel bars: F+B with hip flexion 2X  F+B with hip abd 1X  F+B with hip abd/er 1X   (45 minutes)    OP EDUCATION: [facility]  Access Code: [email or web address]  URL: [email or web address]  Date: 09/12/2024  Prepared by: Armando Quintanilla    Exercises  - Side Stepping with Resistance at Ankles and Counter Support  - 1 x daily - 7 x weekly - 3 sets - 10 reps  - Forward and Backward Monster Walk with Resistance at Ankles and Counter Support  - 1 x daily - 7 x weekly - 3 sets - 10 reps    Goals:  Active       PT Problem       PT Goal 1 (Met)       Start:  08/21/24    Expected End:  11/19/24    Resolved:  10/08/24    Right leg pain 5/10 or less         PT Goal 2 (Progressing)       Start:  08/21/24    Expected End:  11/19/24       Improve " lefs by 20%         PT Goal 3 (Met)       Start:  08/21/24    Expected End:  11/19/24    Resolved:  10/08/24    SLS 3-5 seconds on right, 10 seconds on left         PT Goal 4 (Progressing)       Start:  08/21/24    Expected End:  11/19/24       5/5 hip strength          PT Goal 5 (Progressing)       Start:  08/21/24    Expected End:  11/19/24       Normalize gait with appropriate assistive device  Able to do steps reciprocally

## 2024-11-06 DIAGNOSIS — M16.10 HIP ARTHRITIS: ICD-10-CM

## 2024-11-06 RX ORDER — PANTOPRAZOLE SODIUM 40 MG/1
40 TABLET, DELAYED RELEASE ORAL DAILY
Qty: 90 TABLET | Refills: 1 | Status: SHIPPED | OUTPATIENT
Start: 2024-11-06

## 2024-11-07 ENCOUNTER — TREATMENT (OUTPATIENT)
Dept: PHYSICAL THERAPY | Facility: CLINIC | Age: 74
End: 2024-11-07
Payer: MEDICARE

## 2024-11-07 DIAGNOSIS — G57.01 SCIATIC NERVE PALSY, RIGHT: ICD-10-CM

## 2024-11-07 PROCEDURE — 97110 THERAPEUTIC EXERCISES: CPT | Mod: GP | Performed by: PHYSICAL THERAPIST

## 2024-11-07 NOTE — PROGRESS NOTES
"Physical Therapy    Physical Therapy Treatment    Patient Name: Nancy Nuñez  MRN: [id number]  Today's Date: 11/7/2024  Time Entry:   Time Calculation  Start Time: 0100  Stop Time: 0145  Time Calculation (min): 45 min     PT Therapeutic Procedures Time Entry  Therapeutic Exercise Time Entry: 45    Visit: 17/17  Insurance: [employer]  Authorization: 8/21/24-11/19/24    Assessment:  Patient seen in PT for 17 visits for sciatic nerve palsy following CHIVO.  Patient with decreased pain to 0/10, improved sls to 10 sec+, improved hip strength to 5/5 except hip abd to 4/5, normal gait with rollator and able to do steps reciprocally.  Discharge to [facility]       Plan:   Discharge to [facility]       Current Problem  1. Sciatic nerve palsy, right  Follow Up In Physical Therapy          Subjective    Patient states she is okay.    Precautions: fall risk,CHIVO precautions       Pain   0/10 R hip pain at worse      Objective     10 sec SLS bilaterally  5/5 hip add, 4/5 hip abd, flex and ext 20X each  Fairly normal gait with rolling walker and AFO  Able to do steps reciprocally   LEFS = 22.5%    Treatments:    Therapeutic Exercise x 40 minutes  SciFit stepper 10 minutes  Up and down steps X 3 reciprocallly  SLS on foam 5X each  Step ups 6\" 20X  Standing hip ext, abd, and flexion 20X each  Patient walked 200' without device and AFO and close sup by  with gait belt  Reviewed technique with    F+B with hip flexion 20' X 2  F+B with hip abd 20' X 1  F+B with hip abd/er 20' X 1  (45 minutes)    OP EDUCATION: [facility]  Access Code: [email or web address]  URL: [email or web address]  Date: 09/12/2024  Prepared by: Armando Quintanilla    Exercises  - Side Stepping with Resistance at Ankles and Counter Support  - 1 x daily - 7 x weekly - 3 sets - 10 reps  - Forward and Backward Monster Walk with Resistance at Ankles and Counter Support  - 1 x daily - 7 x weekly - 3 sets - 10 reps    Goals:  Active       PT Problem       PT Goal 1 (Met)       Start:  " 08/21/24    Expected End:  11/19/24    Resolved:  10/08/24    Right leg pain 5/10 or less         PT Goal 2 (Progressing)       Start:  08/21/24    Expected End:  11/19/24       Improve lefs by 20%         PT Goal 3 (Met)       Start:  08/21/24    Expected End:  11/19/24    Resolved:  10/08/24    SLS 3-5 seconds on right, 10 seconds on left         PT Goal 4 (Progressing)       Start:  08/21/24    Expected End:  11/19/24       5/5 hip strength          PT Goal 5 (Progressing)       Start:  08/21/24    Expected End:  11/19/24       Normalize gait with appropriate assistive device  Able to do steps reciprocally

## 2024-11-08 ENCOUNTER — HOSPITAL ENCOUNTER (OUTPATIENT)
Dept: NEUROLOGY | Facility: CLINIC | Age: 74
Discharge: HOME | End: 2024-11-08
Payer: MEDICARE

## 2024-11-08 DIAGNOSIS — M79.605 LEFT LEG PAIN: ICD-10-CM

## 2024-11-08 PROCEDURE — 95911 NRV CNDJ TEST 9-10 STUDIES: CPT | Performed by: PSYCHIATRY & NEUROLOGY

## 2024-11-08 PROCEDURE — 95886 MUSC TEST DONE W/N TEST COMP: CPT | Mod: GC | Performed by: PSYCHIATRY & NEUROLOGY

## 2024-11-08 PROCEDURE — 95886 MUSC TEST DONE W/N TEST COMP: CPT | Performed by: PSYCHIATRY & NEUROLOGY

## 2024-11-08 PROCEDURE — 95911 NRV CNDJ TEST 9-10 STUDIES: CPT | Mod: GC | Performed by: PSYCHIATRY & NEUROLOGY

## 2024-11-11 DIAGNOSIS — G57.01 SCIATIC NEUROPATHY, RIGHT: Primary | ICD-10-CM

## 2024-11-11 NOTE — PROGRESS NOTES
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 Recommended referral to neurosurgery Dr. Fountain.    Candace Mc MPAS, PA-C, ATC  Orthopedic Physician Assisant  Adult Reconstruction and Total Joint Replacement  General Orthopedics  Department of Orthopaedic Surgery  [place] [id number]

## 2024-11-13 ENCOUNTER — TELEPHONE (OUTPATIENT)
Dept: GASTROENTEROLOGY | Facility: CLINIC | Age: 74
End: 2024-11-13
Payer: MEDICARE

## 2024-11-13 DIAGNOSIS — A04.8 H. PYLORI INFECTION: Primary | ICD-10-CM

## 2024-11-13 NOTE — TELEPHONE ENCOUNTER
----- Message from Miky Ardon sent at 11/13/2024 11:15 AM EST -----  Regarding: RE: H. Pylori  I ordered breath test please call the patient and please tell them to be safe they should be off the proton pump inhibitor prior to the test for 3 weeks  ----- Message -----  From: Adrianne Zambrano MA  Sent: 11/13/2024  10:28 AM EST  To: Miky Ardon MD  Subject: RE: H. Pylori                                    Spouse says yes, she can tolerate it, because she actually had been off of it for a while and only recently resumed so he is confident she can manage 2 weeks without  it.  ----- Message -----  From: Miky Ardon MD  Sent: 11/13/2024  10:00 AM EST  To: Adrianne Zambrano MA  Subject: RE: H. Pylori                                    Adrianne please call the patient that yes we can order the H. pylori but they would have to be off their proton pump inhibitor i.e. pantoprazole for greater than 2 weeks prior to the exam.  My question would be can she tolerate being off pantoprazole.  ----- Message -----  From: Adrianne Zambrano MA  Sent: 11/13/2024   9:20 AM EST  To: Miky Ardon MD  Subject: H. Pylori                                        Spouse calls, asks if patient can have H. Pylori test, complaining of stomach pain daily.    Spouse [phone]

## 2024-11-14 ENCOUNTER — OFFICE VISIT (OUTPATIENT)
Dept: ORTHOPEDIC SURGERY | Facility: CLINIC | Age: 74
End: 2024-11-14
Payer: MEDICARE

## 2024-11-14 DIAGNOSIS — Z96.641 AFTERCARE FOLLOWING RIGHT HIP JOINT REPLACEMENT SURGERY: Primary | ICD-10-CM

## 2024-11-14 DIAGNOSIS — Z47.1 AFTERCARE FOLLOWING RIGHT HIP JOINT REPLACEMENT SURGERY: Primary | ICD-10-CM

## 2024-11-14 DIAGNOSIS — G57.01 SCIATIC NERVE PALSY, RIGHT: ICD-10-CM

## 2024-11-14 PROCEDURE — 1036F TOBACCO NON-USER: CPT | Performed by: ORTHOPAEDIC SURGERY

## 2024-11-14 PROCEDURE — 99211 OFF/OP EST MAY X REQ PHY/QHP: CPT | Performed by: ORTHOPAEDIC SURGERY

## 2024-11-14 PROCEDURE — 1159F MED LIST DOCD IN RCRD: CPT | Performed by: ORTHOPAEDIC SURGERY

## 2024-11-14 PROCEDURE — 1157F ADVNC CARE PLAN IN RCRD: CPT | Performed by: ORTHOPAEDIC SURGERY

## 2024-11-14 NOTE — PROGRESS NOTES
Patient is a 74-year-old female presents today for follow-up after right total hip arthroplasty.  Unfortunately she had a sciatic nerve palsy after surgery.  She is wearing an AFO.  It sounds like she is been having some issues with blood pressure and passing out recently.  Today in clinic she is very lethargic and looks quite debilitated.  She had an EMG which demonstrated sciatic neuropathy without disruption of the nerve.    Right hip:  Pain free range of motion of right hip with no pain radiating to the groin whatsoever, incision is well healed with no signs of surrounding infection.  Foot drop on the right side with decrease in station in the peroneal distribution    X-rays reviewed by myself today in clinic reveal excellent alignment of the total hip arthroplasty components with no signs of early loosening or failure.    I discussed with her and her  today in clinic that regarding the sciatic nerve palsy there is really nothing to do besides time and monitoring.  She should continue to wear the AFO.  I will talk to my PA about getting her into the nerve injury clinic.  Regarding her other systemic symptoms they would typically not be related to her total hip replacement.  Should continue to work with her primary care physician in terms of further testing.  All of their questions were answered.  Will continue to follow clinically.    This note was created using voice recognition software and was not corrected for typographical or grammatical errors.

## 2024-12-04 ENCOUNTER — TELEPHONE (OUTPATIENT)
Dept: GASTROENTEROLOGY | Facility: CLINIC | Age: 74
End: 2024-12-04
Payer: MEDICARE

## 2024-12-04 NOTE — TELEPHONE ENCOUNTER
The patient is having weight loss and the daughter-in-law calls and she will see Dr. Escobedo next week for evaluation of weight loss to be seen by me in January.  I have recommended more frequent smaller meals and enhancing protein for calories.  Will follow-up with her in the office.    ----- Message from Adrianne ATKINS sent at 12/4/2024  3:43 PM EST -----  Regarding: Call  Williamskelley in law of patient calls, wants to speak to you, I explained that she is not listed patients chart as contact and she said well just ask the patient she will say its ok.  I leave this up to you.  She is concerned about patient, says she is losing a lot of weight.  We scheduled a fuv on 1/14, which is next available.  Patient is supposed to be going for H. Pylori test tomorrow.    Dip[place] [phone]

## 2024-12-05 ENCOUNTER — OFFICE VISIT (OUTPATIENT)
Dept: NEUROSURGERY | Facility: HOSPITAL | Age: 74
End: 2024-12-05
Payer: MEDICARE

## 2024-12-05 ENCOUNTER — OFFICE VISIT (OUTPATIENT)
Dept: NEUROLOGY | Facility: HOSPITAL | Age: 74
End: 2024-12-05
Payer: MEDICARE

## 2024-12-05 VITALS
BODY MASS INDEX: 18.05 KG/M2 | HEART RATE: 74 BPM | HEIGHT: 67 IN | SYSTOLIC BLOOD PRESSURE: 105 MMHG | WEIGHT: 115 LBS | DIASTOLIC BLOOD PRESSURE: 68 MMHG | RESPIRATION RATE: 18 BRPM

## 2024-12-05 DIAGNOSIS — G57.01 SCIATIC NEUROPATHY, RIGHT: ICD-10-CM

## 2024-12-05 DIAGNOSIS — G57.01 SCIATIC NEUROPATHY, RIGHT: Primary | ICD-10-CM

## 2024-12-05 PROCEDURE — 99214 OFFICE O/P EST MOD 30 MIN: CPT | Performed by: STUDENT IN AN ORGANIZED HEALTH CARE EDUCATION/TRAINING PROGRAM

## 2024-12-05 PROCEDURE — 1036F TOBACCO NON-USER: CPT | Performed by: STUDENT IN AN ORGANIZED HEALTH CARE EDUCATION/TRAINING PROGRAM

## 2024-12-05 PROCEDURE — 1157F ADVNC CARE PLAN IN RCRD: CPT | Performed by: STUDENT IN AN ORGANIZED HEALTH CARE EDUCATION/TRAINING PROGRAM

## 2024-12-05 PROCEDURE — 99204 OFFICE O/P NEW MOD 45 MIN: CPT | Performed by: STUDENT IN AN ORGANIZED HEALTH CARE EDUCATION/TRAINING PROGRAM

## 2024-12-05 PROCEDURE — 1159F MED LIST DOCD IN RCRD: CPT | Performed by: STUDENT IN AN ORGANIZED HEALTH CARE EDUCATION/TRAINING PROGRAM

## 2024-12-05 PROCEDURE — 3008F BODY MASS INDEX DOCD: CPT | Performed by: STUDENT IN AN ORGANIZED HEALTH CARE EDUCATION/TRAINING PROGRAM

## 2024-12-05 NOTE — PROGRESS NOTES
[facility] Peripheral Nerve Clinic  Department of Neurological Surgery  New Patient Visit    History of Present Illness:  Nancy Nuñez is a 74 y.o. year old female who presents to the [facility] Per[facility]erve Clinic with right foot drop following a total hip arthroplasty on 6/5/2024.  I am seeing her in conjunction with my colleague, Dr. Marte. She is wearing an AFO and has trialed physical therapy.      She presents with her .     Patient's BMI is Body mass index is 18.28 kg/m².    14/14 systems reviewed and negative other than what is listed in the history of present illness    Patient Active Problem List   Diagnosis    Unilateral primary osteoarthritis, right hip    Chronic idiopathic constipation    Primary osteoarthritis of right hip    Hip arthritis     Past Medical History:   Diagnosis Date    Anxiety and depression     Inpatient stays x 2 in 2010    Cataract     Bilaterally    H. pylori infection     1/2024 per EGD    HLD (hyperlipidemia)     HTN (hypertension)     Hyponatremia     Chronic Low normal - low    Hypothyroidism     due to Hashimoto's thyroiditis clinically and biochemically euthyroid on replacement    Osteoarthritis     Osteoporosis     Posterior vitreous detachment of right eye      Past Surgical History:   Procedure Laterality Date    COLONOSCOPY      Internal hemorrhoids, diverticulosis, polyps    ESOPHAGOSCOPY / EGD      HYSTEROSCOPY  09/22/2006    RHINOPLASTY      TONSILLECTOMY      TOTAL HIP ARTHROPLASTY Bilateral 06/05/2024    Left 4/20/2022, Right 6/5/2024     Social History     Tobacco Use    Smoking status: Former     Types: Cigarettes     Passive exposure: Past    Smokeless tobacco: Never   Substance Use Topics    Alcohol use: Not Currently     family history is not on file.    Current Outpatient Medications:     alendronate (Fosamax) 70 mg tablet, Take 1 tablet (70 mg) by mouth every 7 days., Disp: , Rfl:     amLODIPine (Norvasc) 2.5 mg tablet, Take 1 tablet (2.5 mg)  by mouth once daily. Take BP prior to resuming medication. Take medication if BP is > or = 130/90., Disp: , Rfl:     ARIPiprazole (Abilify) 2 mg tablet, , Disp: , Rfl:     atorvastatin (Lipitor) 10 mg tablet, Take 1 tablet (10 mg) by mouth once daily., Disp: , Rfl:     escitalopram (Lexapro) 10 mg tablet, Take 2 tablets (20 mg) by mouth once daily at bedtime., Disp: , Rfl:     FLUoxetine (PROzac) 20 mg capsule, Take 3 capsules (60 mg) by mouth early in the morning.. (Patient taking differently: Take 2 capsules (40 mg) by mouth early in the morning..), Disp: , Rfl:     levothyroxine (Synthroid, Levoxyl) 112 mcg tablet, Take 1 tablet (112 mcg) by mouth once daily. X 6 days, not on Sundays, Disp: , Rfl:     LORazepam (Ativan) 0.5 mg tablet, Take 1 tablet (0.5 mg) by mouth every 6 hours if needed for anxiety., Disp: , Rfl:     pantoprazole (ProtoNix) 40 mg EC tablet, Take 1 tablet (40 mg) by mouth once daily. Do not crush, chew, or split., Disp: 90 tablet, Rfl: 1    simvastatin (Zocor) 10 mg tablet, Take 1 tablet (10 mg) by mouth once daily at bedtime., Disp: , Rfl:     amoxicillin (Amoxil) 500 mg capsule, Take 4 Tablets 1 Hour Prior to Dental Procedure or Cleaning. (Patient not taking: Reported on 12/5/2024), Disp: 4 capsule, Rfl: 6  Allergies   Allergen Reactions    Cat Dander Runny nose    Dog Dander Runny nose    House Dust Runny nose    Mold Runny nose       Physical Examination    General: frail appearance    She has absent dorsiflexion or toe extension.  Her eversion appears preserved and her remaining muscles are grossly full strength.      She can only poorly describe that her right leg has diminished sensation.  She had difficulty quantifying the extent of deficit.     Her ankle joint is supple.     Results    I personally reviewed and interpreted the imaging results which included:     EMG 11/8/24  Impression:  This is an abnormal study. There is electrophysiologic evidence consistent with an active and  chronic right sciatic neuropathy, affecting peroneal more than tibial fibers. Continuity is demonstrated to distal muscles.  The normal motor units in the right tensor fascia latae muscle makes lumbosacral plexopathy less likely.    Assessment and Plan:   Nancy Nuñez is a 74 y.o. year old female who presents with right foot drop following a total hip arthroplasty on 6/5/2024.  She is wearing an AFO and has trialed physical therapy.      Her exam shows an absence of movement with dorsiflexion of the ankle and toes. She has roughly full strength in the remaining muscles.  She is now 6 months post injury and her EMG suggests that collateral innervation has already occurred. There are some units seen in the TA and EHL demonstrating continuity.      Myself and Dr. Marte had a thorough discussion with Nancy and her . We explained that there is still hope for natural recovery through axon regeneration but that this will take a long time (~ 1 inch per month, elongate from the hip to the mid leg).  Since she has preservation of several muscles in the distal leg, she is a candidate for tendon transfers, such as TP to TA.  We will refer her to an orthopedic specialist for these. We also emphasized the need to maintain full passive ROM in her ankle joint to eventually regain her function, whether from natural recovery versus tendon transfer.  We recommend physical therapy, including aquatherapy. Now that here leg seems to have achieved a new baseline in recovery, her gait should be re-evaluated and they should make sure she has the most optimal AFO. The lower extremity specialist may have special requests for therapy as well.  She also currently seems quite frail and she would likely require optimization prior to any future procedures.     At this point, there are no neurosurgical options being offered and the plan is for her to follow-up with Dr. Marte in 6 months.   - Physical Therapy   - Referral to lower extremity  specialist for tendon transfer options    I have reviewed all prior documentation and reviewed the electronic medical record since admission. I have personally have reviewed all advanced imaging not just the reports and used my interpretation as documented as the relevant findings. I have reviewed the risks and benefits of all treatment recommendations listed in this note with the patient and family.     The above clinical summary has been dictated with voice recognition software. It has not been proofread for grammatical errors, typographical mistakes, or other semantic inconsistencies.    Thank you for visiting our office today. It was our pleasure to take part in your healthcare.     Do not hesitate to call with any questions regarding your plan of care after leaving at [phone] M-F 8am-4pm.     To clinicians, thank you very much for this kind referral. It is a privilege to partner with you in the care of your patients. My office would be delighted to assist you with any further consultations or with questions regarding the plan of care outlined. Do not hesitate to call the office or contact me directly.       Sincerely,      Milan Fountain MD, PhD  Attending Neurosurgeon, [facility]   of Neurological Surgery  [facility] [employer]  Office: [phone]  Fax: [phone]    [facility]  [address]  [address] [address]

## 2024-12-05 NOTE — PROGRESS NOTES
Peripheral Nerve Clinic Note     Nancy Nuñez, MRN: [id number], : 1950  Reason for Referral: Right foot weakness  Primary Care Physician: Rosemary Escobedo MD     Impression/Plan:   Impression:  Nancy Nuñez is a 74 year old who presents to the peripheral nerve clinic for evaluation of right sciatic neuropathy. On 24, the patient underwent a right total hip replacement. She experienced right foot/ankle weakness post-operatively which has minimally improved since the onset. Underwent EMG which revealed a right sciatic neuropathy affecting peroneal more than tibial fibers. Notably, electrophysiologically the deep peroneal and tibial nerves appeared to be in-continuity. Her exam is notable for right ankle weakness, severe in the deep peroneal nerve innervated muscles.    Overall, patient's clinical presentation and electrodiagnostic testing are consistent with an incomplete, axonal, right sciatic mononeuropathy. Notably, there are motor responses at the extensor digitorum brevis, tibialis anterior and abductor hallucis on nerve conduction indicating integrity of these distal nerves. Given the timeline, we are currently in the window of axonogenesis which, given the distance between the sciatic nerve (and potential site of stretch/injury) and distal muscles, we may not see improvement in function at the ankle until ~1 year from injury. We discussed options including early tendon transfer (tibialis posterior specifically to create dorsiflexion) vs waiting and giving more time to determine the amount of recovery in 1 year before considering tendon transfer. Will wait ~1 year post-insult to determine amount of recovery and whether palliative tibialis posterior tendon transfer should be pursued.    Plan:  -Will wait ~1 year from injury to determine amount of recovery prior to consideration of possible palliative tendon transfer  -Referral for aquatic therapy sent  -Continue physical therapy  -Continue regular  stretching of the right ankle (both dorsi/plantarflexion and lateral movements) to prevent contracture of the joint  -Recommended patient take daily multivitamin (ensure proper nerve regrowth with questionable nutrition and weight loss)    RTC around June-July 2025    Wong Poon MD  Neuromuscular Fellow     Attending addendum:  I evaluated the patient with Dr. Poon and agree with his assessment and recommendations as outlined above.    History of Present Illness:    Ms. Nuñez is a 74 y.o. female who presents for evaluation of right sciatic neuropathy.    On 6/5/24, patient underwent right total hip replacement for treatment of arthritis. Per report, on post-op day #3, it was discovered that the patient had weakness in her right ankle, most with dorsiflexion. Patient and  note that the weakness was quite severe and improved little since the initial insult. Has been using an AFO since the surgery. Has had ~6 mechanical falls falls, but sustained no significant injury as a result.  She is now utilizing a walker for safety. Her  notes that she is not independent and needs assistance in her day to day life. She has been going to PT. Her  stretches her foot at least once per day to keep it flexible. Patient denies any pain or problem with any of her other extremities. Does have a 60 lb weight loss and  is worried about her nutrition.      Physical Exam:     Limited neurological exam    Manual Muscle Testing (MMT) reveals the following MRC grades:  R   Ankle dorsiflexion  0   Ankle plantarflexion  4   Ankle inversion   4   Ankle eversion   3   Big toe extension (EHL) 0   Toe flexion (FDL)  4       Results:     EMG:  EMG from 11/8/24 performed by Dr. Sparrow and Dr. Cooper reviewed:  There is a right sciatic neuropathy affecting the peroneal division more than the tibial division.

## 2024-12-06 ENCOUNTER — LAB (OUTPATIENT)
Dept: LAB | Facility: LAB | Age: 74
End: 2024-12-06
Payer: MEDICARE

## 2024-12-06 DIAGNOSIS — A04.8 H. PYLORI INFECTION: ICD-10-CM

## 2024-12-06 PROCEDURE — 83013 H PYLORI (C-13) BREATH: CPT

## 2024-12-07 LAB — UREA BREATH TEST QL: NEGATIVE

## 2025-01-14 ENCOUNTER — OFFICE VISIT (OUTPATIENT)
Dept: GASTROENTEROLOGY | Facility: HOSPITAL | Age: 75
End: 2025-01-14
Payer: MEDICARE

## 2025-01-14 VITALS — HEIGHT: 67 IN | BODY MASS INDEX: 18.05 KG/M2 | WEIGHT: 115 LBS

## 2025-01-14 DIAGNOSIS — R63.4 WEIGHT LOSS: Primary | ICD-10-CM

## 2025-01-14 PROCEDURE — 99214 OFFICE O/P EST MOD 30 MIN: CPT | Performed by: INTERNAL MEDICINE

## 2025-01-14 PROCEDURE — 3008F BODY MASS INDEX DOCD: CPT | Performed by: INTERNAL MEDICINE

## 2025-01-14 PROCEDURE — 1159F MED LIST DOCD IN RCRD: CPT | Performed by: INTERNAL MEDICINE

## 2025-01-14 PROCEDURE — 1157F ADVNC CARE PLAN IN RCRD: CPT | Performed by: INTERNAL MEDICINE

## 2025-01-14 ASSESSMENT — ENCOUNTER SYMPTOMS
MUSCULOSKELETAL NEGATIVE: 1
EYES NEGATIVE: 1
RESPIRATORY NEGATIVE: 1
HEMATOLOGIC/LYMPHATIC NEGATIVE: 1
GASTROINTESTINAL NEGATIVE: 1
ENDOCRINE NEGATIVE: 1
PSYCHIATRIC NEGATIVE: 1
CARDIOVASCULAR NEGATIVE: 1
UNEXPECTED WEIGHT CHANGE: 1
NEUROLOGICAL NEGATIVE: 1
ALLERGIC/IMMUNOLOGIC NEGATIVE: 1

## 2025-01-14 NOTE — ASSESSMENT & PLAN NOTE
Patient is a 74-year-old with a history of hypothyroidism, major depression, anxiety, osteoporosis, hyperlipidemia, H. pylori infection with eradication, and previous COVID for which she is lost her taste and smell limiting her oral intake.  She has had extensive evaluation for weight loss that included chest x-ray, CAT scan of the abdomen, endoscopy, colonoscopy, and blood work over the last year and a half.  This is shown no occult malignancy.  It is my impression her weight loss is 1 of poor p.o. intake in the setting of no taste and no interest in eating.  This is compounded by depression and anxiety.  I am recommending more frequent meals of soft items.  I have recommended a goal of 1 g per protein per pound or close 220 g of protein per day discussing Ensure, protein shakes, more frequent smaller meals, meat, and also considering restarting Remeron at 7-1/2 to 15 mg before bed.  All these maneuvers I think will improve her appetite and weight.  I have asked her to keep chart of her weight at home.  She is up-to-date on procedures.  There is no other intervention needed from a GI standpoint.  It is reasonable to hold her proton pump inhibitor.  They are comfortable with this approach and thankful to go over all her results.

## 2025-01-14 NOTE — PROGRESS NOTES
Weight loss    History of Present Illness:   Nancy Nuñez is a 74 y.o. female with PMHx of anxiety, depression, hyperlipidemia, H. pylori infection, osteoporosis, and weight loss and who presents to GI clinic for follow up.  Last seen around 2024 for upper endoscopy.  She has had COVID and subsequently then lost her taste and smell that is limited her oral intake.  She has a history of major depression and anxiety that also is limited her oral intake.  She has had extensive evaluation for weight loss by colonoscopy, endoscopy, chest x-ray, CAT scan of the abdomen, and blood work.  She has seen her primary care doctor who recommended other reevaluation of her thyroid which they are managing.  They have also prescribed Remeron 7 and half milligrams at bed.  They did not give this a significant trial and did not think this have is beneficial.  She denies nausea, vomiting and is having better bowel movements now on Colace and MiraLAX.    She has no other complaints.  She is frustrated that she has not gained weight.    Options of nutrition including oral, NG and PEG placement were also discussed    Her medications are reviewed    Her CAT scan and chest x-ray are reviewed    Her endoscopic and colonoscopy report are reviewed    Review of Systems  Review of Systems   Constitutional:  Positive for unexpected weight change.   HENT: Negative.     Eyes: Negative.    Respiratory: Negative.     Cardiovascular: Negative.    Gastrointestinal: Negative.    Endocrine: Negative.    Genitourinary: Negative.    Musculoskeletal: Negative.    Skin: Negative.    Allergic/Immunologic: Negative.    Neurological: Negative.    Hematological: Negative.    Psychiatric/Behavioral: Negative.     All other systems reviewed and are negative.      Allergies  Allergies   Allergen Reactions    Cat Dander Runny nose    Dog Dander Runny nose    House Dust Runny nose    Mold Runny nose       Medications  Current Outpatient Medications   Medication  Instructions    alendronate (FOSAMAX) 70 mg, Every 7 days    amLODIPine (NORVASC) 2.5 mg, oral, Daily, Take BP prior to resuming medication. Take medication if BP is > or = 130/90.    amoxicillin (Amoxil) 500 mg capsule Take 4 Tablets 1 Hour Prior to Dental Procedure or Cleaning.    ARIPiprazole (Abilify) 2 mg tablet     atorvastatin (LIPITOR) 10 mg, Daily RT    escitalopram (LEXAPRO) 20 mg, Nightly    FLUoxetine (PROzac) 20 mg capsule 3 capsules, Daily (0630)    levothyroxine (SYNTHROID, LEVOXYL) 112 mcg, Daily    LORazepam (ATIVAN) 0.5 mg, Every 6 hours PRN    pantoprazole (PROTONIX) 40 mg, oral, Daily, Do not crush, chew, or split.    simvastatin (ZOCOR) 10 mg, Nightly        Objective   There were no vitals taken for this visit.   Physical Exam  Constitutional:       Appearance: Normal appearance.   Eyes:      Conjunctiva/sclera: Conjunctivae normal.   Cardiovascular:      Rate and Rhythm: Normal rate and regular rhythm.   Pulmonary:      Effort: Pulmonary effort is normal.      Breath sounds: Normal breath sounds.   Abdominal:      General: Abdomen is flat. Bowel sounds are normal.      Palpations: Abdomen is soft.   Musculoskeletal:         General: Normal range of motion.      Cervical back: Normal range of motion.   Neurological:      Mental Status: She is alert.           Lab Results   Component Value Date    WBC 10.3 06/06/2024    WBC 7.5 05/22/2024    WBC 11.9 (H) 04/21/2022    HGB 9.6 (L) 06/06/2024    HGB 13.4 05/22/2024    HGB 10.9 (L) 04/21/2022    HCT 29.5 (L) 06/06/2024    HCT 41.9 05/22/2024    HCT 33.8 (L) 04/21/2022     06/06/2024     05/22/2024     04/21/2022     Lab Results   Component Value Date     (L) 06/06/2024     (L) 06/05/2024     (L) 05/22/2024    K 4.2 06/06/2024    K 3.9 06/05/2024    K 4.1 05/22/2024     06/06/2024    CL 97 (L) 06/05/2024    CL 98 05/22/2024    CO2 22 06/06/2024    CO2 25 06/05/2024    CO2 25 05/22/2024    BUN 10  06/06/2024    BUN 12 06/05/2024    BUN 13 05/22/2024    CREATININE 0.65 06/06/2024    CREATININE 0.76 06/05/2024    CREATININE 0.92 05/22/2024    CALCIUM 8.5 (L) 06/06/2024    CALCIUM 8.9 06/05/2024    CALCIUM 9.8 05/22/2024    PROT 7.9 05/22/2024    BILITOT 0.4 05/22/2024    ALKPHOS 56 05/22/2024    ALT 6 (L) 05/22/2024    AST 10 05/22/2024    GLUCOSE 106 (H) 06/06/2024    GLUCOSE 139 (H) 06/05/2024    GLUCOSE 84 05/22/2024           Nancy Nuñez is a 74 y.o. female who presents to GI clinic for unintentional weight loss.    Weight loss  Patient is a 74-year-old with a history of hypothyroidism, major depression, anxiety, osteoporosis, hyperlipidemia, H. pylori infection with eradication, and previous COVID for which she is lost her taste and smell limiting her oral intake.  She has had extensive evaluation for weight loss that included chest x-ray, CAT scan of the abdomen, endoscopy, colonoscopy, and blood work over the last year and a half.  This is shown no occult malignancy.  It is my impression her weight loss is 1 of poor p.o. intake in the setting of no taste and no interest in eating.  This is compounded by depression and anxiety.  I am recommending more frequent meals of soft items.  I have recommended a goal of 1 g per protein per pound or close 220 g of protein per day discussing Ensure, protein shakes, more frequent smaller meals, meat, and also considering restarting Remeron at 7-1/2 to 15 mg before bed.  All these maneuvers I think will improve her appetite and weight.  I have asked her to keep chart of her weight at home.  She is up-to-date on procedures.  There is no other intervention needed from a GI standpoint.  It is reasonable to hold her proton pump inhibitor.  They are comfortable with this approach and thankful to go over all her results.       Miky Ardon MD

## 2025-06-04 ENCOUNTER — TELEPHONE (OUTPATIENT)
Dept: GASTROENTEROLOGY | Facility: CLINIC | Age: 75
End: 2025-06-04
Payer: MEDICARE

## 2025-06-04 NOTE — TELEPHONE ENCOUNTER
----- Message from Miky Ardon sent at 6/4/2025  1:42 PM EDT -----  Regarding: RE: Colace  Please call and sure  ----- Message -----  From: Adrianne Zambrano MA  Sent: 6/4/2025   1:20 PM EDT  To: Miky Ardon MD  Subject: Colace                                           Spouse calls. Patient is taking colace and about half dose of Miralax daily.  Sometimes having a bowel movement every day sometimes every few days.  Asking if can cut the colace to every other day?

## 2025-06-24 ENCOUNTER — APPOINTMENT (OUTPATIENT)
Dept: PRIMARY CARE | Facility: CLINIC | Age: 75
End: 2025-06-24
Payer: MEDICARE

## 2025-06-24 VITALS — WEIGHT: 110 LBS | DIASTOLIC BLOOD PRESSURE: 68 MMHG | BODY MASS INDEX: 17.49 KG/M2 | SYSTOLIC BLOOD PRESSURE: 112 MMHG

## 2025-06-24 DIAGNOSIS — M79.604 PAIN OF RIGHT LOWER EXTREMITY: Primary | ICD-10-CM

## 2025-06-24 DIAGNOSIS — G47.9 SLEEP DISTURBANCE: ICD-10-CM

## 2025-06-24 DIAGNOSIS — K21.9 GASTROESOPHAGEAL REFLUX DISEASE, UNSPECIFIED WHETHER ESOPHAGITIS PRESENT: ICD-10-CM

## 2025-06-24 DIAGNOSIS — R63.0 LACK OF APPETITE: ICD-10-CM

## 2025-06-24 DIAGNOSIS — R26.89 BALANCE PROBLEM: ICD-10-CM

## 2025-06-24 DIAGNOSIS — R43.0 ANOSMIA: ICD-10-CM

## 2025-06-24 PROCEDURE — 1159F MED LIST DOCD IN RCRD: CPT | Performed by: INTERNAL MEDICINE

## 2025-06-24 PROCEDURE — 99214 OFFICE O/P EST MOD 30 MIN: CPT | Performed by: INTERNAL MEDICINE

## 2025-06-24 RX ORDER — CHOLECALCIFEROL (VITAMIN D3) 25 MCG
25 TABLET ORAL DAILY
COMMUNITY

## 2025-06-24 RX ORDER — POLYETHYLENE GLYCOL 3350 17 G/17G
17 POWDER, FOR SOLUTION ORAL DAILY
COMMUNITY

## 2025-06-24 RX ORDER — DOCUSATE SODIUM 100 MG/1
100 CAPSULE, LIQUID FILLED ORAL EVERY OTHER DAY
COMMUNITY

## 2025-06-24 RX ORDER — FAMOTIDINE 40 MG/1
40 TABLET, FILM COATED ORAL 2 TIMES DAILY
Qty: 60 TABLET | Refills: 5 | Status: SHIPPED | OUTPATIENT
Start: 2025-06-24 | End: 2025-12-21

## 2025-06-24 NOTE — PATIENT INSTRUCTIONS
Hold off on tomato sauce like pizza , lasagna, spaghetti and other acidic foods, at least for a month     Try green juice - celery, cucumbers, kale , green apples . Zora  there are several recipes online can hold off lime juice    When you eat dry foods, add soup to eat with it     There is a medication to help stimulate appetite called Megace     Start the famotidine to help with the acidity in the stomach     Consider acupuncture  .Please call our  [phone] to assist with scheduling

## 2025-06-24 NOTE — PROGRESS NOTES
Subjective   Patient ID: Nancy Nuñez is a 75 y.o. female who presents for Weight Loss (Weight loss second opinion, started losing weight after infection, lost over 60 pounds. ).    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  S  Surgery nicked the sciatic nerve , did one hip and that was fine did the other hip and that wasn't fine, weh nteh sciatic nerve  Starting to move after a year   Every day is cold , always, her dose levothyroxine adjusted several times     Always dry mouth  Most of the time ts    Takes vit d , mvi     Sleep disruption wakes up several times   Medication that was too strong started with a q, she was not oriented for 8 hrs after waking up    There right foot still is numb , her feet bothers her numb annoying     Review of Systems  General: Denies fever, chills, night sweats,  Eyes: Negative for recent visual changes  Ears, Nose, Throat :  Negative for hearing changes, sinus discomfort  Dermatologic: Negative for new skin conditions, rash  Respiratory: Negative for wheezing, shortness of breath, cough  Cardiovascular: Negative for chest pain, palpitations, or leg swelling  Gastrointestinal: Negative for nausea/vomiting, abdominal pain, changes in bowel habits  Genitourinary Negative for Urinary Incontinence  urgency , frequency, discomfort   Musculoskeletal: see hpi  Neurological: Negative for headaches, dizziness    Previous history  Medical  History[1]  Surgical History[2]  Social History[3]  Family History[4]  Allergies[5]  Current Outpatient Medications   Medication Instructions    alendronate (FOSAMAX) 70 mg, Every 7 days    amLODIPine (NORVASC) 2.5 mg, oral, Daily, Take BP prior to resuming medication. Take medication if BP is > or = 130/90.    amoxicillin (Amoxil) 500 mg capsule Take 4 Tablets 1 Hour Prior to Dental Procedure or Cleaning.    ARIPiprazole (Abilify) 2 mg tablet     atorvastatin (LIPITOR) 10 mg, Daily RT    cholecalciferol (VITAMIN D3) 25 mcg, Daily    docusate sodium (COLACE) 100 mg, Every other day    escitalopram (LEXAPRO) 20 mg, Nightly    FLUoxetine (PROzac) 20 mg capsule 3 capsules, Daily (0630)    levothyroxine (SYNTHROID, LEVOXYL) 112 mcg, Daily    LORazepam (ATIVAN) 0.5 mg, Every 6 hours PRN    pantoprazole (PROTONIX) 40 mg, oral, Daily, Do not crush, chew, or split.    polyethylene glycol (GLYCOLAX, MIRALAX) 17 g, Daily    simvastatin (ZOCOR) 10 mg, Nightly       Objective       Physical Exam    Vital Signs: as recorded above  General: Well groomed, well nourished   Orientation:  Alert , oriented to time, place , and person   Mood and Affect:  Cooperative , no apparent distress normal affect  Skin: Good color, good turgor  Eyes: Extra ocular muscle movements intact, anicteric sclerae  Neck: Supple, full range of movement  Chest: Normal breath sounds, normal chest wall exam, symmetric, good air entry, clear to auscultation  Heart: Regular rate and rhythm, without murmur, gallop, or rubs  Abdomen soft nontender no masses felt no hepatosplenomegaly, no rebound or guarding  BACK:  no CTLS spine tenderness, no flank tenderness  Extremities: full range of movement  bilateral UE and bilateral LE,  no lower extremity edema  Neurological: Alert, oriented, cranial nerves II-XII grossly intact except for visual acuity  Sensation:  Intact   Gait: normal steady    Assessment/Plan   Nancy Nuñez is a 75 y.o. female who presents for the  concerns below:    Problem List Items Addressed This Visit    None  Weight loss may be partially affected by anosmia , she reports some taste usually sweet  ,this seems like it is impacting her morale and contributes to her depression.  Will try to help tongue papillae recover , she has acidic food in her diet and likely reflux acidity . Trial Hold off on tomato sauce like pizza , lasagna, spaghetti and other acidic foods, at least for a month     To get much needed vitamins and nutrients can do ensure or boost but can also Try green juice - celery, cucumbers, kale , green apples . Zora  there are several recipes online can hold off lime juice (even if vegetables rich in oxalic acid may cause tongue irritation , so not adding spinach to green juice )   She reports it is uncomfortable eating certain foods. Tried to identify and may be dry foods so When you eat dry foods, add soup to eat with it   Adding famotidine may also help reflux effects on the esophagus and help palatability of dry food    If still losing weight dangerouslyThere is a medication to help stimulate appetite called Megace     Reflux Start the famotidine as mentioned above  to help with the acidity in the stomach     Consider acupuncture or acupressure   .Please call our  [phone] to assist with scheduling     Her PCP DR Escobedo has done all the work-up for her weight loss and she is seeing her mental health specialist so I am not going to delve into the medications for her depression/anxiety/stress  but encouraging the patient to exercise walk and getting nutrients and hydration (balancing out sodium and hydration)  may motivate her to be more active then start a good sequence of events of feeling good     Also consider rheumatological  testing  - dysphagia dry mouth?   Ebv , I will check if they did this          Discussed with:   Return in :    Portions of this note were generated using digital voice recognition software, and may  contain grammatical errors       Corazon Esparza MD  06/24/25  3:38 PM       [1]   Past Medical History:  Diagnosis Date    Anxiety and depression     Inpatient stays x 2 in 2010    Cataract     Bilaterally    H. pylori infection     1/2024 per EGD    HLD (hyperlipidemia)     HTN (hypertension)     Hyponatremia     Chronic Low normal - low    Hypothyroidism     due to Hashimoto's thyroiditis clinically and biochemically euthyroid on replacement    Osteoarthritis     Osteoporosis     Posterior vitreous detachment of right eye    [2]   Past Surgical History:  Procedure Laterality Date    COLONOSCOPY      Internal hemorrhoids, diverticulosis, polyps    ESOPHAGOSCOPY / EGD      HYSTEROSCOPY  09/22/2006    RHINOPLASTY      TONSILLECTOMY      TOTAL HIP ARTHROPLASTY Bilateral 06/05/2024    Left 4/20/2022, Right 6/5/2024   [3]   Social History  Tobacco Use    Smoking status: Former     Types: Cigarettes     Passive exposure: Past    Smokeless tobacco: Never   Substance Use Topics    Alcohol use: Not Currently    Drug use: Never   [4] No family history on file.  [5]   Allergies  Allergen Reactions    Cat Dander Runny nose    Dog Dander Runny nose    House Dust Runny nose    Mold Runny nose

## 2025-08-11 ENCOUNTER — APPOINTMENT (OUTPATIENT)
Dept: PRIMARY CARE | Facility: CLINIC | Age: 75
End: 2025-08-11
Payer: MEDICARE

## 2025-08-11 VITALS — SYSTOLIC BLOOD PRESSURE: 110 MMHG | BODY MASS INDEX: 17.01 KG/M2 | DIASTOLIC BLOOD PRESSURE: 70 MMHG | WEIGHT: 107 LBS

## 2025-08-11 DIAGNOSIS — Z13.228 SCREENING FOR ENDOCRINE/METABOLIC/IMMUNITY DISORDERS: ICD-10-CM

## 2025-08-11 DIAGNOSIS — Z13.0 SCREENING FOR DEFICIENCY ANEMIA: ICD-10-CM

## 2025-08-11 DIAGNOSIS — K59.00 CONSTIPATION, UNSPECIFIED CONSTIPATION TYPE: Primary | ICD-10-CM

## 2025-08-11 DIAGNOSIS — Z12.31 SCREENING MAMMOGRAM FOR BREAST CANCER: ICD-10-CM

## 2025-08-11 DIAGNOSIS — Z13.228 SCREENING FOR ENDOCRINE, METABOLIC AND IMMUNITY DISORDER: ICD-10-CM

## 2025-08-11 DIAGNOSIS — Z13.0 SCREENING FOR ENDOCRINE, METABOLIC AND IMMUNITY DISORDER: ICD-10-CM

## 2025-08-11 DIAGNOSIS — Z13.29 SCREENING FOR ENDOCRINE, METABOLIC AND IMMUNITY DISORDER: ICD-10-CM

## 2025-08-11 DIAGNOSIS — K21.9 GASTROESOPHAGEAL REFLUX DISEASE, UNSPECIFIED WHETHER ESOPHAGITIS PRESENT: ICD-10-CM

## 2025-08-11 DIAGNOSIS — Z13.0 SCREENING FOR ENDOCRINE/METABOLIC/IMMUNITY DISORDERS: ICD-10-CM

## 2025-08-11 DIAGNOSIS — Z13.29 SCREENING FOR ENDOCRINE/METABOLIC/IMMUNITY DISORDERS: ICD-10-CM

## 2025-08-11 DIAGNOSIS — R63.4 WEIGHT LOSS, UNINTENTIONAL: ICD-10-CM

## 2025-08-11 DIAGNOSIS — R68.81 EARLY SATIETY: ICD-10-CM

## 2025-08-11 PROCEDURE — 1159F MED LIST DOCD IN RCRD: CPT | Performed by: INTERNAL MEDICINE

## 2025-08-11 PROCEDURE — 99214 OFFICE O/P EST MOD 30 MIN: CPT | Performed by: INTERNAL MEDICINE

## 2025-08-11 RX ORDER — FAMOTIDINE 40 MG/1
40 TABLET, FILM COATED ORAL 2 TIMES DAILY
Qty: 60 TABLET | Refills: 5 | Status: SHIPPED | OUTPATIENT
Start: 2025-08-11 | End: 2026-02-07

## 2025-08-11 ASSESSMENT — PATIENT HEALTH QUESTIONNAIRE - PHQ9
SUM OF ALL RESPONSES TO PHQ9 QUESTIONS 1 AND 2: 0
2. FEELING DOWN, DEPRESSED OR HOPELESS: NOT AT ALL
1. LITTLE INTEREST OR PLEASURE IN DOING THINGS: NOT AT ALL
1. LITTLE INTEREST OR PLEASURE IN DOING THINGS: NOT AT ALL
2. FEELING DOWN, DEPRESSED OR HOPELESS: NOT AT ALL
SUM OF ALL RESPONSES TO PHQ9 QUESTIONS 1 AND 2: 0

## 2025-08-12 LAB
ALBUMIN SERPL-MCNC: 4.1 G/DL (ref 3.6–5.1)
ALP SERPL-CCNC: 43 U/L (ref 37–153)
ALT SERPL-CCNC: 7 U/L (ref 6–29)
ANION GAP SERPL CALCULATED.4IONS-SCNC: 8 MMOL/L (CALC) (ref 7–17)
AST SERPL-CCNC: 9 U/L (ref 10–35)
BASOPHILS # BLD AUTO: 72 CELLS/UL (ref 0–200)
BASOPHILS NFR BLD AUTO: 1.2 %
BILIRUB SERPL-MCNC: 0.5 MG/DL (ref 0.2–1.2)
BUN SERPL-MCNC: 18 MG/DL (ref 7–25)
CALCIUM SERPL-MCNC: 9.1 MG/DL (ref 8.6–10.4)
CHLORIDE SERPL-SCNC: 101 MMOL/L (ref 98–110)
CO2 SERPL-SCNC: 26 MMOL/L (ref 20–32)
CREAT SERPL-MCNC: 0.72 MG/DL (ref 0.6–1)
EGFRCR SERPLBLD CKD-EPI 2021: 87 ML/MIN/1.73M2
EOSINOPHIL # BLD AUTO: 72 CELLS/UL (ref 15–500)
EOSINOPHIL NFR BLD AUTO: 1.2 %
ERYTHROCYTE [DISTWIDTH] IN BLOOD BY AUTOMATED COUNT: 12.5 % (ref 11–15)
GLUCOSE SERPL-MCNC: 79 MG/DL (ref 65–139)
HCT VFR BLD AUTO: 36.6 % (ref 35–45)
HGB BLD-MCNC: 11.8 G/DL (ref 11.7–15.5)
LYMPHOCYTES # BLD AUTO: 1344 CELLS/UL (ref 850–3900)
LYMPHOCYTES NFR BLD AUTO: 22.4 %
MCH RBC QN AUTO: 32 PG (ref 27–33)
MCHC RBC AUTO-ENTMCNC: 32.2 G/DL (ref 32–36)
MCV RBC AUTO: 99.2 FL (ref 80–100)
MONOCYTES # BLD AUTO: 348 CELLS/UL (ref 200–950)
MONOCYTES NFR BLD AUTO: 5.8 %
NEUTROPHILS # BLD AUTO: 4164 CELLS/UL (ref 1500–7800)
NEUTROPHILS NFR BLD AUTO: 69.4 %
PLATELET # BLD AUTO: 356 THOUSAND/UL (ref 140–400)
PMV BLD REES-ECKER: 10.3 FL (ref 7.5–12.5)
POTASSIUM SERPL-SCNC: 4.2 MMOL/L (ref 3.5–5.3)
PROT SERPL-MCNC: 8.3 G/DL (ref 6.1–8.1)
RBC # BLD AUTO: 3.69 MILLION/UL (ref 3.8–5.1)
SODIUM SERPL-SCNC: 135 MMOL/L (ref 135–146)
T4 FREE SERPL-MCNC: 1.6 NG/DL (ref 0.8–1.8)
TSH SERPL-ACNC: 0.03 MIU/L (ref 0.4–4.5)
WBC # BLD AUTO: 6 THOUSAND/UL (ref 3.8–10.8)

## 2025-08-13 ENCOUNTER — APPOINTMENT (OUTPATIENT)
Dept: INTEGRATIVE MEDICINE | Facility: CLINIC | Age: 75
End: 2025-08-13
Payer: MEDICARE

## 2025-08-13 DIAGNOSIS — M25.551 CHRONIC HIP PAIN, RIGHT: Primary | ICD-10-CM

## 2025-08-13 DIAGNOSIS — G89.29 CHRONIC HIP PAIN, RIGHT: Primary | ICD-10-CM

## 2025-08-13 DIAGNOSIS — M16.11 PRIMARY OSTEOARTHRITIS OF RIGHT HIP: ICD-10-CM

## 2025-08-13 PROCEDURE — 97810 ACUP 1/> WO ESTIM 1ST 15 MIN: CPT | Performed by: ACUPUNCTURIST

## 2025-08-13 PROCEDURE — 99202 OFFICE O/P NEW SF 15 MIN: CPT | Performed by: ACUPUNCTURIST

## 2025-08-13 PROCEDURE — 97811 ACUP 1/> W/O ESTIM EA ADD 15: CPT | Performed by: ACUPUNCTURIST

## 2025-08-15 ENCOUNTER — APPOINTMENT (OUTPATIENT)
Dept: PRIMARY CARE | Facility: CLINIC | Age: 75
End: 2025-08-15
Payer: MEDICARE

## 2025-08-18 ENCOUNTER — APPOINTMENT (OUTPATIENT)
Dept: INTEGRATIVE MEDICINE | Facility: CLINIC | Age: 75
End: 2025-08-18
Payer: MEDICARE

## 2025-08-19 ENCOUNTER — ALLIED HEALTH (OUTPATIENT)
Dept: INTEGRATIVE MEDICINE | Facility: CLINIC | Age: 75
End: 2025-08-19
Payer: MEDICARE

## 2025-08-19 DIAGNOSIS — G89.29 CHRONIC HIP PAIN, RIGHT: Primary | ICD-10-CM

## 2025-08-19 DIAGNOSIS — M25.551 CHRONIC HIP PAIN, RIGHT: Primary | ICD-10-CM

## 2025-08-19 PROCEDURE — 97810 ACUP 1/> WO ESTIM 1ST 15 MIN: CPT | Performed by: NATUROPATH

## 2025-08-19 PROCEDURE — 97811 ACUP 1/> W/O ESTIM EA ADD 15: CPT | Performed by: NATUROPATH

## 2025-08-21 DIAGNOSIS — Z13.228 SCREENING FOR ENDOCRINE/METABOLIC/IMMUNITY DISORDERS: Primary | ICD-10-CM

## 2025-08-21 DIAGNOSIS — Z13.29 SCREENING FOR ENDOCRINE/METABOLIC/IMMUNITY DISORDERS: Primary | ICD-10-CM

## 2025-08-21 DIAGNOSIS — Z13.0 SCREENING FOR ENDOCRINE/METABOLIC/IMMUNITY DISORDERS: Primary | ICD-10-CM

## 2025-08-21 DIAGNOSIS — E03.9 HYPOTHYROIDISM, UNSPECIFIED TYPE: ICD-10-CM

## 2025-08-27 ENCOUNTER — APPOINTMENT (OUTPATIENT)
Dept: INTEGRATIVE MEDICINE | Facility: CLINIC | Age: 75
End: 2025-08-27
Payer: MEDICARE

## 2025-08-27 DIAGNOSIS — G89.29 CHRONIC HIP PAIN, RIGHT: ICD-10-CM

## 2025-08-27 DIAGNOSIS — M25.551 CHRONIC HIP PAIN, RIGHT: ICD-10-CM

## 2025-08-27 DIAGNOSIS — M16.11 UNILATERAL PRIMARY OSTEOARTHRITIS, RIGHT HIP: Primary | ICD-10-CM

## 2025-09-10 ENCOUNTER — APPOINTMENT (OUTPATIENT)
Dept: INTEGRATIVE MEDICINE | Facility: CLINIC | Age: 75
End: 2025-09-10
Payer: MEDICARE

## 2025-09-16 ENCOUNTER — APPOINTMENT (OUTPATIENT)
Dept: GASTROENTEROLOGY | Facility: HOSPITAL | Age: 75
End: 2025-09-16
Payer: MEDICARE

## 2025-09-17 ENCOUNTER — APPOINTMENT (OUTPATIENT)
Dept: INTEGRATIVE MEDICINE | Facility: CLINIC | Age: 75
End: 2025-09-17
Payer: MEDICARE

## 2025-09-23 ENCOUNTER — APPOINTMENT (OUTPATIENT)
Dept: INTEGRATIVE MEDICINE | Facility: CLINIC | Age: 75
End: 2025-09-23
Payer: MEDICARE

## 2025-10-01 ENCOUNTER — APPOINTMENT (OUTPATIENT)
Dept: INTEGRATIVE MEDICINE | Facility: CLINIC | Age: 75
End: 2025-10-01
Payer: MEDICARE

## 2025-11-12 ENCOUNTER — APPOINTMENT (OUTPATIENT)
Dept: PRIMARY CARE | Facility: CLINIC | Age: 75
End: 2025-11-12
Payer: MEDICARE

## (undated) DEVICE — DRAPE, INCISE, STERI DRAPE, LARGE, 23 X 17 IN, DISPOSABLE, STERILE

## (undated) DEVICE — Device

## (undated) DEVICE — GLOVE, SURGICAL, PROTEXIS PI BLUE W/NEUTHERA, 7.0, PF, LF

## (undated) DEVICE — HOOD, SURGICAL, FLYTE HYBRID

## (undated) DEVICE — GLOVE, SURGICAL, PROTEXIS PI , 7.5, PF, LF

## (undated) DEVICE — DRAPE, IRRIGATION, W/POUCH, ADHESIVE STRIP, STERI DRAPE, 19 X 23 IN, DISPOSABLE, STERILE

## (undated) DEVICE — GLOVE, SURGICAL, PROTEXIS PI , 8.0, PF, LF

## (undated) DEVICE — SUTURE, VICRYL, 0, 18 IN, CT-1, UNDYED

## (undated) DEVICE — NEEDLE, SPINAL, QUINCKE, 18 G X 3.5 IN, PINK HUB

## (undated) DEVICE — SUTURE, VICRYL, 2-0, 18 IN CP-2, UNDYED

## (undated) DEVICE — BLADE, SAW, SAGITTAL, 18.5 X 73 X 0.76 MM, STAINLESS STEEL, STERILE

## (undated) DEVICE — STRIP, SKIN CLOSURE, STERI STRIP, REINFORCED, 0.5 X 4 IN

## (undated) DEVICE — DRAPE, ISOLATION, INCISE, W/POUCH, STERI DRAPE, 125 X 83 IN, DISPOSABLE, STERILE

## (undated) DEVICE — BLANKET, LOWER BODY, VHA PLUS, ADULT

## (undated) DEVICE — SYRINGE, 60 CC, IRRIGATION, BULB, CONTRO-BULB, PAPER POUCH

## (undated) DEVICE — GLOVE, SURGICAL, PROTEXIS PI , 6.5, PF, LF

## (undated) DEVICE — SUTURE, ETHIBOND XTRA, 5 V-37, GRN/BR, LF

## (undated) DEVICE — SYRINGE, 50 CC, LUER LOCK

## (undated) DEVICE — RETRIEVER, SUTURE, STERILE